# Patient Record
Sex: FEMALE | Race: WHITE | NOT HISPANIC OR LATINO | Employment: OTHER | URBAN - NONMETROPOLITAN AREA
[De-identification: names, ages, dates, MRNs, and addresses within clinical notes are randomized per-mention and may not be internally consistent; named-entity substitution may affect disease eponyms.]

---

## 2021-02-26 DIAGNOSIS — I10 ESSENTIAL HYPERTENSION: ICD-10-CM

## 2021-02-26 DIAGNOSIS — I10 ESSENTIAL HYPERTENSION: Primary | ICD-10-CM

## 2021-02-26 RX ORDER — LOSARTAN POTASSIUM 100 MG/1
100 TABLET ORAL DAILY
Qty: 90 TABLET | Refills: 3 | Status: SHIPPED | OUTPATIENT
Start: 2021-02-26 | End: 2021-07-02 | Stop reason: SDUPTHER

## 2021-02-26 RX ORDER — LOSARTAN POTASSIUM 100 MG/1
100 TABLET ORAL DAILY
COMMUNITY
End: 2021-02-26 | Stop reason: SDUPTHER

## 2021-02-26 RX ORDER — LOSARTAN POTASSIUM 100 MG/1
100 TABLET ORAL DAILY
Qty: 90 TABLET | Refills: 3 | Status: SHIPPED | OUTPATIENT
Start: 2021-02-26 | End: 2021-02-26 | Stop reason: SDUPTHER

## 2021-05-25 RX ORDER — LOSARTAN POTASSIUM 50 MG/1
TABLET ORAL
Qty: 90 TABLET | Refills: 0 | OUTPATIENT
Start: 2021-05-25

## 2021-06-15 DIAGNOSIS — I10 ESSENTIAL HYPERTENSION: ICD-10-CM

## 2021-06-15 DIAGNOSIS — I48.4 ATYPICAL ATRIAL FLUTTER (HCC): Primary | ICD-10-CM

## 2021-06-15 NOTE — TELEPHONE ENCOUNTER
NEEDS REFILL    FLECAINIDE 50MG 1BID  AND NEEDING HER BP MED  DOES NOT KNOW THE NAME    [facility]

## 2021-06-18 ENCOUNTER — TELEPHONE (OUTPATIENT)
Dept: CARDIOLOGY | Facility: CLINIC | Age: 66
End: 2021-06-18

## 2021-06-18 DIAGNOSIS — I10 ESSENTIAL HYPERTENSION: ICD-10-CM

## 2021-06-18 RX ORDER — LOSARTAN POTASSIUM 100 MG/1
100 TABLET ORAL DAILY
Qty: 30 TABLET | Refills: 0 | Status: CANCELLED | OUTPATIENT
Start: 2021-06-18

## 2021-06-18 RX ORDER — FLECAINIDE ACETATE 50 MG/1
50 TABLET ORAL 2 TIMES DAILY
Qty: 60 TABLET | Refills: 0 | Status: SHIPPED | OUTPATIENT
Start: 2021-06-18

## 2021-06-18 NOTE — TELEPHONE ENCOUNTER
PATIENT IS MAD THAT THE losartan (COZAAR) 100 MG tablet    HAS NOT BEEN FILLED SHE HAS BEEN OFF OF IT FOR 4 DAYS    PLEASE ADVISE    [facility] - [place], [place] - [address] [facility] [phone] [facility] [phone]

## 2021-06-18 NOTE — TELEPHONE ENCOUNTER
Wrong pharmacy was in the computer, I called correct pharm and gave them a verbal for the losartan

## 2021-07-02 ENCOUNTER — OFFICE VISIT (OUTPATIENT)
Dept: CARDIOLOGY | Facility: CLINIC | Age: 66
End: 2021-07-02

## 2021-07-02 VITALS
DIASTOLIC BLOOD PRESSURE: 76 MMHG | TEMPERATURE: 97 F | SYSTOLIC BLOOD PRESSURE: 136 MMHG | RESPIRATION RATE: 12 BRPM | HEIGHT: 62 IN | BODY MASS INDEX: 35.88 KG/M2 | HEART RATE: 79 BPM | WEIGHT: 195 LBS | OXYGEN SATURATION: 96 %

## 2021-07-02 DIAGNOSIS — Z95.0 PRESENCE OF CARDIAC PACEMAKER: ICD-10-CM

## 2021-07-02 DIAGNOSIS — I10 ESSENTIAL HYPERTENSION: ICD-10-CM

## 2021-07-02 DIAGNOSIS — E78.5 DYSLIPIDEMIA: ICD-10-CM

## 2021-07-02 DIAGNOSIS — F17.200 TOBACCO DEPENDENCE SYNDROME: ICD-10-CM

## 2021-07-02 DIAGNOSIS — I48.0 PAROXYSMAL ATRIAL FIBRILLATION (HCC): Primary | ICD-10-CM

## 2021-07-02 PROCEDURE — 93000 ELECTROCARDIOGRAM COMPLETE: CPT | Performed by: INTERNAL MEDICINE

## 2021-07-02 PROCEDURE — 99214 OFFICE O/P EST MOD 30 MIN: CPT | Performed by: INTERNAL MEDICINE

## 2021-07-02 RX ORDER — LOSARTAN POTASSIUM 100 MG/1
100 TABLET ORAL DAILY
Qty: 90 TABLET | Refills: 3 | Status: SHIPPED | OUTPATIENT
Start: 2021-07-02 | End: 2022-10-03

## 2021-07-02 RX ORDER — EXENATIDE 2 MG/.85ML
2 INJECTION, SUSPENSION, EXTENDED RELEASE SUBCUTANEOUS WEEKLY
COMMUNITY
Start: 2021-06-21

## 2021-07-02 RX ORDER — ATORVASTATIN CALCIUM 10 MG/1
10 TABLET, FILM COATED ORAL DAILY
COMMUNITY
Start: 2021-06-01

## 2021-07-02 RX ORDER — SERTRALINE HYDROCHLORIDE 100 MG/1
100 TABLET, FILM COATED ORAL 2 TIMES DAILY
COMMUNITY
Start: 2021-06-07

## 2021-07-02 RX ORDER — OXYCODONE AND ACETAMINOPHEN 7.5; 325 MG/1; MG/1
TABLET ORAL EVERY 6 HOURS
COMMUNITY
Start: 2021-06-05

## 2021-07-02 NOTE — PROGRESS NOTES
[facility]  [facility] CARDIOLOGY  [address] [place] [address]  Dept: [phone]  Dept Fax: [phone]    Мария Singh  1955    Follow Up Office Visit Note    History of Present Illness:  Мария Singh is a 65 y.o. female who presents to the clinic for Follow-up. Atrial fibrillation- flutter- she is doing fine, asymptomatic, on flecainide 50 mg bid and also eliquis, no complaints     The following portions of the patient's history were reviewed and updated as appropriate: allergies, current medications, past family history, past medical history, past social history, past surgical history and problem list.    Medications:  ALPRAZolam  aspirin EC  atorvastatin  Bydureon BCise auto-injector  chlorhexidine  estradiol  flecainide  glucose blood  insulin detemir  Insulin Pen Needle misc  Insulin Syringe-Needle U-100 misc  losartan  onetouch ultrasoft  oxyCODONE-acetaminophen  sertraline    Subjective  Allergies   Allergen Reactions   • Cephalexin Rash   • Sulfa Antibiotics Rash   • Trimethoprim Rash        Past Medical History:   Diagnosis Date   • Anxiety    • Atrial tachycardia (CMS/HCC)     S/P AV NODE ABLATION AND PPM   • Hypercholesterolemia    • Hyperlipidemia    • Increased serum lipids    • Paroxysmal atrial fibrillation (CMS/HCC)    • Paroxysmal atrial flutter (CMS/HCC)    • Presence of permanent cardiac pacemaker    • Tobacco dependence syndrome    • Type 2 diabetes mellitus (CMS/HCC)        Past Surgical History:   Procedure Laterality Date   • CHOLECYSTECTOMY     • HYSTERECTOMY      Total abdominal with removal of both ovaries   • PACEMAKER IMPLANTATION         Family History   Problem Relation Age of Onset   • Heart failure Mother    • Cancer Mother    • Hypertension Mother    • Diabetes Mother    • Cancer Father         LUNG   • Stroke Other    • Depression Sister         Social History     Socioeconomic History   • Marital status:      Spouse name:  "Not on file   • Number of children: Not on file   • Years of education: Not on file   • Highest education level: Not on file   Tobacco Use   • Smoking status: Current Every Day Smoker     Packs/day: 1.00     Years: 43.00     Pack years: 43.00     Types: Cigarettes   • Smokeless tobacco: Never Used   Substance and Sexual Activity   • Alcohol use: No   • Drug use: No   • Sexual activity: Defer       Review of Systems   Constitutional: Negative.    HENT: Negative.    Respiratory: Negative.    Cardiovascular: Negative.    Endocrine: Negative.    Genitourinary: Negative.    Musculoskeletal: Negative.    Skin: Negative.    Allergic/Immunologic: Negative.    Neurological: Negative.    Hematological: Negative.    Psychiatric/Behavioral: Negative.        Cardiovascular Procedures    ECHO/MUGA:   STRESS TESTS:   CARDIAC CATH:   DEVICES:   HOLTER:   CT/MRI:   VASCULAR:   CARDIOTHORACIC:     Objective  Vitals:    07/02/21 1019   BP: 136/76   BP Location: Left arm   Patient Position: Lying   Cuff Size: Adult   Pulse: 79   Resp: 12   Temp: 97 °F (36.1 °C)   TempSrc: Infrared   SpO2: 96%   Weight: 88.5 kg (195 lb)   Height: 157.5 cm (62\")   PainSc: 0-No pain     Body mass index is 35.67 kg/m².     Physical Exam  Constitutional:       Appearance: Healthy appearance. Not in distress.   Neck:      Vascular: No JVR. JVD normal.   Pulmonary:      Effort: Pulmonary effort is normal.      Breath sounds: Normal breath sounds. No wheezing. No rhonchi. No rales.   Chest:      Chest wall: Not tender to palpatation.   Cardiovascular:      PMI at left midclavicular line. Normal rate. Regular rhythm. Normal S1. Normal S2.      Murmurs: There is no murmur.      No gallop. No click. No rub.   Pulses:     Intact distal pulses.   Edema:     Peripheral edema absent.   Abdominal:      General: Bowel sounds are normal.      Palpations: Abdomen is soft.      Tenderness: There is no abdominal tenderness.   Musculoskeletal: Normal range of motion.        "  General: No tenderness. Skin:     General: Skin is warm and dry.   Neurological:      General: No focal deficit present.      Mental Status: Alert and oriented to person, place and time.          Diagnostic Data    ECG 12 Lead    Date/Time: 7/2/2021 10:47 AM  Performed by: Capo Lock MD  Authorized by: Capo Lock MD   Comparison: compared with previous ECG   Similar to previous ECG  Rhythm: paced  Rate: normal  QRS axis: normal  Pacing: atrial sensed rhythm and ventricular paced rhythm  Clinical impression: abnormal EKG            Assessment and Plan  Diagnoses and all orders for this visit:    Paroxysmal atrial fibrillation (CMS/HCC)-Doing good on flecainide 50 mg bid, and Eliquis, 5mg bid, will keep same meds EKG paced rhythm    Dyslipidemia- on Lipitor 10 mg, we need Lipid profile         Essential hypertension- BP is better 140.80 , on Losartan 50 mg, , will increase to 100 mg   -     losartan (COZAAR) 100 MG tablet; Take 1 tablet by mouth Daily.    Presence of cardiac pacemaker- This is working well    Other orders  -     atorvastatin (LIPITOR) 10 MG tablet  -     Bydureon BCise 2 MG/0.85ML auto-injector injection  -     oxyCODONE-acetaminophen (PERCOCET) 7.5-325 MG per tablet  -     sertraline (ZOLOFT) 100 MG tablet         Return in about 6 months (around 1/2/2022) for Recheck.    Capo Lock MD  07/02/2021

## 2021-07-15 ENCOUNTER — TELEPHONE (OUTPATIENT)
Dept: CARDIOLOGY | Facility: CLINIC | Age: 66
End: 2021-07-15

## 2021-07-15 NOTE — TELEPHONE ENCOUNTER
Called pt she said her bp med Losartan is too strong, says she gets a headache about 1 hour after taking med. Says it was upped from 50mg to 100mg, wants to know if she can go back on the 50mg? Please advise...

## 2021-07-15 NOTE — TELEPHONE ENCOUNTER
If your Blood pressure is high you need 100 mg or 50 mg twice a day, , lowering will not benefit you, ,

## 2021-07-16 NOTE — TELEPHONE ENCOUNTER
If your Blood pressure is high you need 100 mg or 50 mg twice a day, , lowering will not benefit you, ,.    Pt said it is making her constipated and  Gives her headaches. Pt said he will talk to pcp about all of this . Told her what dr heck said she will call back if she needs help

## 2021-07-28 ENCOUNTER — CLINICAL SUPPORT NO REQUIREMENTS (OUTPATIENT)
Dept: CARDIOLOGY | Facility: CLINIC | Age: 66
End: 2021-07-28

## 2021-07-28 DIAGNOSIS — Z95.0 CARDIAC PACEMAKER IN SITU: Primary | ICD-10-CM

## 2021-07-28 PROCEDURE — 93281 PM DEVICE PROGR EVAL MULTI: CPT | Performed by: INTERNAL MEDICINE

## 2021-10-20 ENCOUNTER — CLINICAL SUPPORT NO REQUIREMENTS (OUTPATIENT)
Dept: CARDIOLOGY | Facility: CLINIC | Age: 66
End: 2021-10-20

## 2021-10-20 DIAGNOSIS — Z95.0 CARDIAC PACEMAKER IN SITU: Primary | ICD-10-CM

## 2021-10-20 PROCEDURE — 93281 PM DEVICE PROGR EVAL MULTI: CPT | Performed by: INTERNAL MEDICINE

## 2022-01-05 ENCOUNTER — TELEPHONE (OUTPATIENT)
Dept: CARDIOLOGY | Facility: CLINIC | Age: 67
End: 2022-01-05

## 2022-01-05 NOTE — TELEPHONE ENCOUNTER
Called pt to ask about the recent Flecainide refill request through [facility], she said he PCP fills the med and she doesn't use that pharmacy anyways.

## 2022-01-12 ENCOUNTER — CLINICAL SUPPORT NO REQUIREMENTS (OUTPATIENT)
Dept: CARDIOLOGY | Facility: CLINIC | Age: 67
End: 2022-01-12

## 2022-01-12 DIAGNOSIS — Z95.0 CARDIAC PACEMAKER IN SITU: Primary | ICD-10-CM

## 2022-01-12 PROCEDURE — 93281 PM DEVICE PROGR EVAL MULTI: CPT | Performed by: INTERNAL MEDICINE

## 2022-04-20 ENCOUNTER — CLINICAL SUPPORT NO REQUIREMENTS (OUTPATIENT)
Dept: CARDIOLOGY | Facility: CLINIC | Age: 67
End: 2022-04-20

## 2022-04-20 DIAGNOSIS — Z95.0 CARDIAC PACEMAKER IN SITU: Primary | ICD-10-CM

## 2022-04-20 PROCEDURE — 93281 PM DEVICE PROGR EVAL MULTI: CPT | Performed by: INTERNAL MEDICINE

## 2022-05-03 ENCOUNTER — TELEPHONE (OUTPATIENT)
Dept: CARDIOLOGY | Facility: CLINIC | Age: 67
End: 2022-05-03

## 2022-07-20 ENCOUNTER — CLINICAL SUPPORT NO REQUIREMENTS (OUTPATIENT)
Dept: CARDIOLOGY | Facility: CLINIC | Age: 67
End: 2022-07-20

## 2022-07-20 DIAGNOSIS — Z95.0 CARDIAC PACEMAKER IN SITU: Primary | ICD-10-CM

## 2022-07-20 DIAGNOSIS — Z95.0 PRESENCE OF CARDIAC PACEMAKER: Primary | ICD-10-CM

## 2022-07-20 DIAGNOSIS — I48.0 PAROXYSMAL ATRIAL FIBRILLATION: ICD-10-CM

## 2022-07-20 PROCEDURE — 93281 PM DEVICE PROGR EVAL MULTI: CPT | Performed by: INTERNAL MEDICINE

## 2022-08-09 ENCOUNTER — OFFICE VISIT (OUTPATIENT)
Dept: CARDIOLOGY | Facility: CLINIC | Age: 67
End: 2022-08-09

## 2022-08-09 VITALS
HEART RATE: 93 BPM | WEIGHT: 194 LBS | HEIGHT: 62 IN | SYSTOLIC BLOOD PRESSURE: 132 MMHG | RESPIRATION RATE: 18 BRPM | OXYGEN SATURATION: 99 % | TEMPERATURE: 98 F | BODY MASS INDEX: 35.7 KG/M2 | DIASTOLIC BLOOD PRESSURE: 72 MMHG

## 2022-08-09 DIAGNOSIS — E78.5 DYSLIPIDEMIA: ICD-10-CM

## 2022-08-09 DIAGNOSIS — Z95.0 PRESENCE OF CARDIAC PACEMAKER: ICD-10-CM

## 2022-08-09 DIAGNOSIS — R60.0 BILATERAL LEG EDEMA: ICD-10-CM

## 2022-08-09 DIAGNOSIS — F17.200 TOBACCO DEPENDENCE SYNDROME: ICD-10-CM

## 2022-08-09 DIAGNOSIS — I48.0 PAROXYSMAL ATRIAL FIBRILLATION: Primary | ICD-10-CM

## 2022-08-09 DIAGNOSIS — I50.32 CHRONIC DIASTOLIC CONGESTIVE HEART FAILURE: ICD-10-CM

## 2022-08-09 PROBLEM — I38 DIASTOLIC CHF DUE TO VALVULAR DISEASE: Status: ACTIVE | Noted: 2022-08-09

## 2022-08-09 PROBLEM — I50.30 DIASTOLIC CHF DUE TO VALVULAR DISEASE: Status: RESOLVED | Noted: 2022-08-09 | Resolved: 2022-08-09

## 2022-08-09 PROBLEM — I38 DIASTOLIC CHF DUE TO VALVULAR DISEASE: Status: RESOLVED | Noted: 2022-08-09 | Resolved: 2022-08-09

## 2022-08-09 PROBLEM — I50.30 DIASTOLIC CHF DUE TO VALVULAR DISEASE (HCC): Status: ACTIVE | Noted: 2022-08-09

## 2022-08-09 PROCEDURE — 99214 OFFICE O/P EST MOD 30 MIN: CPT | Performed by: INTERNAL MEDICINE

## 2022-08-09 PROCEDURE — 93000 ELECTROCARDIOGRAM COMPLETE: CPT | Performed by: INTERNAL MEDICINE

## 2022-08-09 NOTE — PROGRESS NOTES
[facility]  [facility] CARDIOLOGY  [address] [place] [address]  Dept: [phone]  Dept Fax: [phone]    Мария Singh  1955    Follow Up Office Visit Note    History of Present Illness:  Мария Singh is a 67 y.o. female who presents to the clinic for Follow-up.Paroxysmal atrial fibrillation- She denies any complaints on Flecainide 50 mg bid and also Eliquis 5 mg bid , EKG ventricular paced rhythm no changes,     The following portions of the patient's history were reviewed and updated as appropriate: allergies, current medications, past family history, past medical history, past social history, past surgical history and problem list.    Medications:  ALPRAZolam  aspirin EC  atorvastatin  Bydureon BCise auto-injector  chlorhexidine  estradiol  flecainide  glucose blood  insulin detemir  Insulin Pen Needle misc  Insulin Syringe-Needle U-100 misc  losartan  onetouch ultrasoft  oxyCODONE-acetaminophen  sertraline    Subjective  Allergies   Allergen Reactions   • Cephalexin Rash   • Sulfa Antibiotics Rash   • Trimethoprim Rash        Past Medical History:   Diagnosis Date   • Anxiety    • Atrial tachycardia (HCC)     S/P AV NODE ABLATION AND PPM   • Hypercholesterolemia    • Hyperlipidemia    • Increased serum lipids    • Paroxysmal atrial fibrillation (HCC)    • Paroxysmal atrial flutter (HCC)    • Presence of permanent cardiac pacemaker    • Tobacco dependence syndrome    • Type 2 diabetes mellitus (HCC)        Past Surgical History:   Procedure Laterality Date   • CHOLECYSTECTOMY     • HYSTERECTOMY      Total abdominal with removal of both ovaries   • PACEMAKER IMPLANTATION         Family History   Problem Relation Age of Onset   • Heart failure Mother    • Cancer Mother    • Hypertension Mother    • Diabetes Mother    • Cancer Father         LUNG   • Stroke Other    • Depression Sister         Social History     Socioeconomic History   • Marital status:   "  Tobacco Use   • Smoking status: Current Every Day Smoker     Packs/day: 1.00     Years: 43.00     Pack years: 43.00     Types: Cigarettes   • Smokeless tobacco: Never Used   Substance and Sexual Activity   • Alcohol use: No   • Drug use: No   • Sexual activity: Defer       Review of Systems   Constitutional: Negative.    HENT: Negative.    Respiratory: Negative.    Cardiovascular: Negative.    Endocrine: Negative.    Genitourinary: Negative.    Musculoskeletal: Negative.    Skin: Negative.    Allergic/Immunologic: Negative.    Neurological: Negative.    Hematological: Negative.    Psychiatric/Behavioral: Negative.        Cardiovascular Procedures    ECHO/MUGA:   STRESS TESTS:   CARDIAC CATH:   DEVICES:   HOLTER:   CT/MRI:   VASCULAR:   CARDIOTHORACIC:     Objective  Vitals:    08/09/22 1359   BP: 132/72   BP Location: Left arm   Patient Position: Sitting   Cuff Size: Adult   Pulse: 93   Resp: 18   Temp: 98 °F (36.7 °C)   TempSrc: Infrared   SpO2: 99%   Weight: 88 kg (194 lb)   Height: 157.5 cm (62\")   PainSc: 0-No pain     Body mass index is 35.48 kg/m².     Physical Exam  Constitutional:       Appearance: Healthy appearance. Not in distress.   Neck:      Vascular: No JVR. JVD normal.   Pulmonary:      Effort: Pulmonary effort is normal.      Breath sounds: Normal breath sounds. No wheezing. No rhonchi. No rales.   Chest:      Chest wall: Not tender to palpatation.   Cardiovascular:      PMI at left midclavicular line. Normal rate. Regular rhythm. Normal S1. Normal S2.      Murmurs: There is no murmur.      No gallop. No click. No rub.   Pulses:     Intact distal pulses.   Edema:     Peripheral edema absent.   Abdominal:      General: Bowel sounds are normal.      Palpations: Abdomen is soft.      Tenderness: There is no abdominal tenderness.   Musculoskeletal: Normal range of motion.         General: No tenderness. Skin:     General: Skin is warm and dry.   Neurological:      General: No focal deficit present.    "   Mental Status: Alert and oriented to person, place and time.          Diagnostic Data    ECG 12 Lead    Date/Time: 8/9/2022 3:35 PM  Performed by: Capo Lock MD  Authorized by: Capo Lock MD   Comparison: compared with previous ECG from 7/2/2021  Similar to previous ECG  Rhythm: sinus rhythm and paced  Rate: normal  BPM: 79  QRS axis: left    Clinical impression: normal ECG            Assessment and Plan  Diagnoses and all orders for this visit:    Paroxysmal atrial fibrillation (HCC)- In sinus asymptomatic on Flecainide 50 mg bid and Eliquis 5 mg bid  -     CBC & Differential  -     Comprehensive Metabolic Panel    Presence of cardiac pacemaker- This is working well., however she is depleting the battery quickly, we have sent her to see Dr Gagnon    Dyslipidemia- On Lipitor 10 mg    Tobacco dependence syndrome- Still smoking     Bilateral leg edema- for the last few weeks has had some bilateral edema, her PCP has rx lasix 20 mg, her lab from the Hospital ER Hb 8, will get lab       -     proBNP         No follow-ups on file.    Capo Lock MD  08/09/2022

## 2022-08-10 ENCOUNTER — TELEPHONE (OUTPATIENT)
Dept: CARDIOLOGY | Facility: CLINIC | Age: 67
End: 2022-08-10

## 2022-08-10 LAB
ALBUMIN SERPL-MCNC: 3.8 G/DL (ref 3.8–4.8)
ALBUMIN/GLOB SERPL: 1.3 {RATIO} (ref 1.2–2.2)
ALP SERPL-CCNC: 179 IU/L (ref 44–121)
ALT SERPL-CCNC: 16 IU/L (ref 0–32)
AST SERPL-CCNC: 22 IU/L (ref 0–40)
BASOPHILS # BLD AUTO: 0.1 X10E3/UL (ref 0–0.2)
BASOPHILS NFR BLD AUTO: 1 %
BILIRUB SERPL-MCNC: <0.2 MG/DL (ref 0–1.2)
BUN SERPL-MCNC: 18 MG/DL (ref 8–27)
BUN/CREAT SERPL: 18 (ref 12–28)
CALCIUM SERPL-MCNC: 8.9 MG/DL (ref 8.7–10.3)
CHLORIDE SERPL-SCNC: 103 MMOL/L (ref 96–106)
CO2 SERPL-SCNC: 24 MMOL/L (ref 20–29)
CREAT SERPL-MCNC: 1.01 MG/DL (ref 0.57–1)
EGFRCR SERPLBLD CKD-EPI 2021: 61 ML/MIN/1.73
EOSINOPHIL # BLD AUTO: 0.3 X10E3/UL (ref 0–0.4)
EOSINOPHIL NFR BLD AUTO: 5 %
ERYTHROCYTE [DISTWIDTH] IN BLOOD BY AUTOMATED COUNT: 15.7 % (ref 11.7–15.4)
GLOBULIN SER CALC-MCNC: 2.9 G/DL (ref 1.5–4.5)
GLUCOSE SERPL-MCNC: 145 MG/DL (ref 65–99)
HCT VFR BLD AUTO: 28.4 % (ref 34–46.6)
HGB BLD-MCNC: 8.4 G/DL (ref 11.1–15.9)
IMM GRANULOCYTES # BLD AUTO: 0 X10E3/UL (ref 0–0.1)
IMM GRANULOCYTES NFR BLD AUTO: 0 %
LYMPHOCYTES # BLD AUTO: 1.4 X10E3/UL (ref 0.7–3.1)
LYMPHOCYTES NFR BLD AUTO: 27 %
MCH RBC QN AUTO: 24.2 PG (ref 26.6–33)
MCHC RBC AUTO-ENTMCNC: 29.6 G/DL (ref 31.5–35.7)
MCV RBC AUTO: 82 FL (ref 79–97)
MONOCYTES # BLD AUTO: 0.4 X10E3/UL (ref 0.1–0.9)
MONOCYTES NFR BLD AUTO: 8 %
NEUTROPHILS # BLD AUTO: 3.1 X10E3/UL (ref 1.4–7)
NEUTROPHILS NFR BLD AUTO: 59 %
NT-PROBNP SERPL-MCNC: 226 PG/ML (ref 0–301)
PLATELET # BLD AUTO: 228 X10E3/UL (ref 150–450)
POTASSIUM SERPL-SCNC: 4.5 MMOL/L (ref 3.5–5.2)
PROT SERPL-MCNC: 6.7 G/DL (ref 6–8.5)
RBC # BLD AUTO: 3.47 X10E6/UL (ref 3.77–5.28)
SODIUM SERPL-SCNC: 138 MMOL/L (ref 134–144)
WBC # BLD AUTO: 5.3 X10E3/UL (ref 3.4–10.8)

## 2022-08-10 NOTE — TELEPHONE ENCOUNTER
Called to follow up on remote monitor. Has not received from [employer] yet. Was ordered 05/31/2022. [employer] unable to give me a estimated arrival time for monitor. Please continue to follow device in office until he can set her up with remote monitoring.

## 2022-08-10 NOTE — TELEPHONE ENCOUNTER
The lab indicated that you are anemic same as at the hospital  will order iron, ferritin transferrin  sat iron studies, the BNP for you heart is normal, please see your MD,    pt is going to call pcp for anemic. Pt is understands what to do .

## 2022-08-10 NOTE — TELEPHONE ENCOUNTER
----- Message from Capo Lock MD sent at 8/10/2022  4:46 PM EDT -----  The lab indicated that you are anemic same as at the hospital  will order iron, ferritin transferrin  sat iron studies, the BNP for you heart is normal, please see your MD,

## 2022-10-03 ENCOUNTER — OFFICE VISIT (OUTPATIENT)
Dept: CARDIOLOGY | Facility: CLINIC | Age: 67
End: 2022-10-03

## 2022-10-03 VITALS
BODY MASS INDEX: 36.44 KG/M2 | OXYGEN SATURATION: 97 % | DIASTOLIC BLOOD PRESSURE: 66 MMHG | WEIGHT: 198 LBS | HEIGHT: 62 IN | SYSTOLIC BLOOD PRESSURE: 112 MMHG | HEART RATE: 75 BPM

## 2022-10-03 DIAGNOSIS — I50.32 CHRONIC DIASTOLIC CONGESTIVE HEART FAILURE: ICD-10-CM

## 2022-10-03 DIAGNOSIS — Z95.0 PRESENCE OF CARDIAC PACEMAKER: ICD-10-CM

## 2022-10-03 DIAGNOSIS — I48.0 PAROXYSMAL ATRIAL FIBRILLATION: ICD-10-CM

## 2022-10-03 DIAGNOSIS — I44.2 COMPLETE HEART BLOCK: Primary | ICD-10-CM

## 2022-10-03 PROBLEM — D50.9 IRON DEFICIENCY ANEMIA: Status: ACTIVE | Noted: 2022-10-03

## 2022-10-03 PROBLEM — E66.812 CLASS 2 OBESITY IN ADULT: Status: ACTIVE | Noted: 2022-10-03

## 2022-10-03 PROBLEM — E66.9 CLASS 2 OBESITY IN ADULT: Status: ACTIVE | Noted: 2022-10-03

## 2022-10-03 PROCEDURE — 99204 OFFICE O/P NEW MOD 45 MIN: CPT | Performed by: INTERNAL MEDICINE

## 2022-10-03 PROCEDURE — 93000 ELECTROCARDIOGRAM COMPLETE: CPT | Performed by: INTERNAL MEDICINE

## 2022-10-03 RX ORDER — POTASSIUM CHLORIDE 750 MG/1
10 TABLET, FILM COATED, EXTENDED RELEASE ORAL DAILY
COMMUNITY

## 2022-10-03 RX ORDER — FUROSEMIDE 20 MG/1
20 TABLET ORAL DAILY
COMMUNITY

## 2022-10-03 NOTE — PROGRESS NOTES
Cardiac Electrophysiology Outpatient Consult Note            [facility]    Consult Note     Мария Singh  [id number]  10/03/2022  [phone]     Primary Care Physician: Montserrat Flores APRN    Referred By: Capo Lock, *    Subjective     Chief Complaint:   Diagnoses and all orders for this visit:    1. Complete heart block (HCC) (Primary)  -     ECG 12 Lead    2. Chronic diastolic congestive heart failure (HCC)    3. Presence of cardiac pacemaker    4. Paroxysmal atrial fibrillation (HCC)      Chief Complaint   Patient presents with   • Atrial Fibrillation   • Pacemaker Problem       History of Present Illness:   Мария Singh is a 67 y.o. female who presents to my electrophysiology clinic for evaluation of pacemaker battery depletion.  She is a moderately poor historian for details.  States she had original pacemaker implantation in 2008.  At some point she had an AV node ablation.  She now has a biventricular permanent pacemaker.  When last seen at her primary cardiologist she was noted to have rapid battery depletion.  She presents today with no complaints of exertional chest pain or dyspnea and orthopnea no PND no claudication.  Her chronic lower extremity edema is currently well managed.  She has no awareness of tachypalpitations, denies dizziness or syncope.  Her blood pressure at home typically runs about 120 mmHg systolic.  She has never had a sleep study.  She denies snoring.    Past Medical History:   Patient Active Problem List    Diagnosis Date Noted   • Class 2 obesity in adult 10/03/2022   • Complete heart block (HCC) 10/03/2022     Note Last Updated: 10/3/2022     · [employer] BiV permanent pacemaker, last generator change 7-20-20     • Iron deficiency anemia 10/03/2022   • Atrial tachycardia (HCC)      Note Last Updated: 10/3/2022        • Chronic diastolic congestive heart failure (HCC) 08/09/2022     Note Last Updated: 10/3/2022  "    · Echocardiogram 7/10/2017: EF 70-75% with normal valvular morphology     • Atrial fibrillation (HCC) 08/12/2016     Note Last Updated: 10/3/2022     · S/P AV NODE ABLATION AND PPM     • Uncontrolled type 2 diabetes mellitus 06/13/2016   • Tobacco abuse 06/13/2016   • Dyslipidemia 06/13/2016   • Presence of cardiac pacemaker 06/13/2016       Past Surgical History:   Past Surgical History:   Procedure Laterality Date   • CHOLECYSTECTOMY     • COLONOSCOPY  09/30/2022   • HYSTERECTOMY      Total abdominal with removal of both ovaries   • PACEMAKER IMPLANTATION         Social History:   Social History     Socioeconomic History   • Marital status:    Tobacco Use   • Smoking status: Current Every Day Smoker     Packs/day: 1.00     Years: 43.00     Pack years: 43.00     Types: Cigarettes   • Smokeless tobacco: Never Used   Substance and Sexual Activity   • Alcohol use: No   • Drug use: No   • Sexual activity: Defer       Medications:     Current Outpatient Medications:   •  ALPRAZolam (XANAX) 1 MG tablet, Take  by mouth., Disp: , Rfl:   •  aspirin  MG EC tablet, Take 1 tablet by mouth., Disp: , Rfl:   •  atorvastatin (LIPITOR) 10 MG tablet, Take 10 mg by mouth Daily., Disp: , Rfl:   •  Bydureon BCise 2 MG/0.85ML auto-injector injection, , Disp: , Rfl:   •  estradiol (ESTRACE) 2 MG tablet, Take  by mouth daily., Disp: , Rfl:   •  flecainide (TAMBOCOR) 50 MG tablet, Take 1 tablet by mouth 2 (Two) Times a Day., Disp: 60 tablet, Rfl: 0  •  furosemide (LASIX) 20 MG tablet, Take 20 mg by mouth Daily., Disp: , Rfl:   •  glucose blood (ONE TOUCH ULTRA TEST) test strip, Check blood sugar X 3/day, Disp: 10 each, Rfl: 5  •  glucose blood test strip, 3 (three) times a day., Disp: , Rfl:   •  Insulin Pen Needle 32G X 4 MM misc, , Disp: , Rfl:   •  Insulin Syringe-Needle U-100 30G X 5/16\" 0.5 ML misc, Uses daily, Disp: 100 each, Rfl: 0  •  Lancets (ONETOUCH ULTRASOFT) lancets, , Disp: , Rfl:   •  Lancets (ONETOUCH " "ULTRASOFT) lancets, Uses X 3 per day, Disp: 100 each, Rfl: 5  •  oxyCODONE-acetaminophen (PERCOCET) 7.5-325 MG per tablet, , Disp: , Rfl:   •  potassium chloride 10 MEQ CR tablet, Take 10 mEq by mouth Daily., Disp: , Rfl:   •  sertraline (ZOLOFT) 100 MG tablet, , Disp: , Rfl:     Allergies:   Allergies   Allergen Reactions   • Metformin Diarrhea and Swelling   • Cephalexin Rash   • Influenza Virus Vaccine Swelling   • Sulfa Antibiotics Rash   • Trimethoprim Rash       Objective   Vital Signs:   Vitals:    10/03/22 0826   BP: 112/66   BP Location: Left arm   Patient Position: Sitting   Pulse: 75   SpO2: 97%   Weight: 89.8 kg (198 lb)   Height: 157.5 cm (62\")       PHYSICAL EXAM  General appearance: Awake, alert, cooperative  Head: Normocephalic, without obvious abnormality, atraumatic  Eyes: Conjunctivae/corneas clear, EOMs intact  Neck: no adenopathy, no carotid bruit, no JVD and thyroid: not enlarged  Lungs: clear to auscultation bilaterally and no rhonchi or crackles\", ' symmetric  Heart: regular rate and rhythm, S1, S2 normal, no murmur, click, rub or gallop  Abdomen: Soft, non-tender, bowel sounds normal,  no organomegaly  Extremities: extremities normal, atraumatic, no cyanosis or edema  Skin: Skin color, turgor normal, no rashes or lesions  Neurologic: Grossly normal     Lab Results   Component Value Date    GLUCOSE 145 (H) 08/09/2022    CALCIUM 8.9 08/09/2022     08/09/2022    K 4.5 08/09/2022    CO2 24 08/09/2022     08/09/2022    BUN 18 08/09/2022    CREATININE 1.01 (H) 08/09/2022    BCR 18 08/09/2022    ANIONGAP 9 02/02/2016     Lab Results   Component Value Date    WBC 5.3 08/09/2022    HGB 8.4 (L) 08/09/2022    HCT 29.1 08/15/2022    MCV 82 08/09/2022     08/09/2022     Cardiac Testing:      I personally viewed and interpreted the patient's EKG/Telemetry/lab data      ECG 12 Lead    Date/Time: 10/3/2022 9:01 AM  Performed by: Hipolito Gagnon DO  Authorized by: Hipolito Gagnon DO "   Previous ECG: no previous ECG available  Rate: normal  BPM: 76  Pacing: atrial sensed rhythm  Clinical impression: abnormal EKG            Tobacco Cessation: Cessation Counseling Provided   Obstructive Sleep Apnea Screening: N/A    Assessment & Plan    Diagnoses and all orders for this visit:    1. Complete heart block (HCC) (Primary)  -     ECG 12 Lead    2. Chronic diastolic congestive heart failure (HCC)    3. Presence of cardiac pacemaker    4. Paroxysmal atrial fibrillation (HCC)         Diagnosis Plan   1. Complete heart block (HCC)   rate control for permanent A. fib.    AV node ablation.    Pacemaker dependent   2. Chronic diastolic congestive heart failure (HCC)   stable.  Euvolemic.  Doing well.  Preserved LV systolic function.   3. Presence of cardiac pacemaker   resynchronization pacemaker.  Left ventricular pacing threshold quite elevated chronically.  Bipolar left ventricular lead placed many years ago.    Battery longevity is suboptimal.    As she approaches the elective replacement indicator we will give further thought to elective LV lead revision.  I am optimistic that we can give her lead with a better pacing threshold and avoid phrenic nerve stimulation which has been problematic for her.   4. Paroxysmal atrial fibrillation (HCC)   flecainide therapy.  50 mg p.o. twice daily.     Body mass index is 36.21 kg/m².    I spent 53 minutes in consultation with this patient which included more than 65% of this time in direct face-to-face counseling, physical examination and discussion of my assessment and findings and shared decision making with the patient.  The remainder of the time not spent face to face was performing one, some or all of the following actions:  preparing to see this patient ( eg. Review of tests),  ordering medications, tests or procedures ), care coordination, discussion of the plan with other healthcare providers, documenting clinical information in Epic well as independently  interpreting results and communicating results to patient, family and or caregiver.  All time noted occurred on the date of service.    Follow Up:       Thank you for allowing me to participate in the care of your patient. Please to not hesitate to contact me with additional questions or concerns.      Hipolito Gagnon DO, [facility], [facility], [place]  Cardiac Electrophysiologist  [facility] / [facility]    Scribed for Hipolito Gagnon DO Scribed for Hipolito Gagnon DO by Electronically signed by STEPHEN Kramer, 10/03/22, 9:02 AM EDT.

## 2022-10-19 ENCOUNTER — CLINICAL SUPPORT NO REQUIREMENTS (OUTPATIENT)
Dept: CARDIOLOGY | Facility: CLINIC | Age: 67
End: 2022-10-19

## 2022-10-19 DIAGNOSIS — Z95.0 CARDIAC PACEMAKER IN SITU: Primary | ICD-10-CM

## 2022-10-19 PROCEDURE — 93281 PM DEVICE PROGR EVAL MULTI: CPT | Performed by: INTERNAL MEDICINE

## 2022-10-20 ENCOUNTER — TELEPHONE (OUTPATIENT)
Dept: CARDIOLOGY | Facility: CLINIC | Age: 67
End: 2022-10-20

## 2022-10-20 NOTE — TELEPHONE ENCOUNTER
"Remote monitoring, per [facility] patient has received monitor. Called patient, she reports she got \"machine\" in and it will not  were she lives. I ask her if she and I could work to get it up and running. She is going to get the box and cellular adapter and call me back.   "

## 2023-01-18 ENCOUNTER — CLINICAL SUPPORT NO REQUIREMENTS (OUTPATIENT)
Dept: CARDIOLOGY | Facility: CLINIC | Age: 68
End: 2023-01-18
Payer: MEDICARE

## 2023-01-18 DIAGNOSIS — Z95.0 CARDIAC PACEMAKER IN SITU: Primary | ICD-10-CM

## 2023-01-18 PROCEDURE — 93281 PM DEVICE PROGR EVAL MULTI: CPT | Performed by: INTERNAL MEDICINE

## 2023-02-06 ENCOUNTER — TELEPHONE (OUTPATIENT)
Dept: CARDIOLOGY | Facility: CLINIC | Age: 68
End: 2023-02-06
Payer: MEDICARE

## 2023-02-06 NOTE — TELEPHONE ENCOUNTER
Left a message for Ms. Samantha that the [employer] rep will be here on 2/15 at 3pmg.  You have a f/u with Dr. Lock on 2/9.  If you would like me to changed your appointment with Dr. Lock to the 15th as well, I would be happy to do that.  Please call and ask for Kinga.

## 2023-02-07 NOTE — TELEPHONE ENCOUNTER
Patient returned your call and her appointment with Dr Lock has been rescheduled to Wed, 2/10, same time as pacemaker check.

## 2023-02-15 ENCOUNTER — CLINICAL SUPPORT NO REQUIREMENTS (OUTPATIENT)
Dept: CARDIOLOGY | Facility: CLINIC | Age: 68
End: 2023-02-15
Payer: MEDICARE

## 2023-02-15 ENCOUNTER — OFFICE VISIT (OUTPATIENT)
Dept: CARDIOLOGY | Facility: CLINIC | Age: 68
End: 2023-02-15
Payer: MEDICARE

## 2023-02-15 VITALS
RESPIRATION RATE: 18 BRPM | SYSTOLIC BLOOD PRESSURE: 130 MMHG | BODY MASS INDEX: 35.33 KG/M2 | HEART RATE: 84 BPM | OXYGEN SATURATION: 98 % | DIASTOLIC BLOOD PRESSURE: 64 MMHG | WEIGHT: 192 LBS | HEIGHT: 62 IN | TEMPERATURE: 97.8 F

## 2023-02-15 DIAGNOSIS — Z95.0 CARDIAC PACEMAKER IN SITU: Primary | ICD-10-CM

## 2023-02-15 DIAGNOSIS — Z72.0 TOBACCO ABUSE: ICD-10-CM

## 2023-02-15 DIAGNOSIS — E78.5 DYSLIPIDEMIA: ICD-10-CM

## 2023-02-15 DIAGNOSIS — Z95.0 PRESENCE OF CARDIAC PACEMAKER: ICD-10-CM

## 2023-02-15 DIAGNOSIS — I50.32 CHRONIC DIASTOLIC CONGESTIVE HEART FAILURE: ICD-10-CM

## 2023-02-15 DIAGNOSIS — I48.0 PAROXYSMAL ATRIAL FIBRILLATION: Primary | ICD-10-CM

## 2023-02-15 PROCEDURE — 93000 ELECTROCARDIOGRAM COMPLETE: CPT | Performed by: INTERNAL MEDICINE

## 2023-02-15 PROCEDURE — 99214 OFFICE O/P EST MOD 30 MIN: CPT | Performed by: INTERNAL MEDICINE

## 2023-02-15 PROCEDURE — 93281 PM DEVICE PROGR EVAL MULTI: CPT | Performed by: INTERNAL MEDICINE

## 2023-02-15 NOTE — PROGRESS NOTES
[facility] [place]  [facility] CARDIOLOGY  [address] [place] [address]  Dept: [phone]  Dept Fax: [phone]    Мария Singh  1955    Follow Up Office Visit Note    History of Present Illness:  Мария Singh is a 67 y.o. female who presents to the clinic for Follow-up. Paroxysmal atrial fibrillation- She has been doing good, but has stop the Eliquis due to Gi complaints, will try Xarelto, she was told her Dawson score is at least 3 and needs to take stronger blood thinner. , she will stop ASA and will take Xarelto 20 mg     The following portions of the patient's history were reviewed and updated as appropriate: allergies, current medications, past family history, past medical history, past social history, past surgical history, and problem list.    Medications:  ALPRAZolam  atorvastatin  Bydureon BCise auto-injector  estradiol  flecainide  furosemide  glucose blood  Insulin Pen Needle misc  Insulin Syringe-Needle U-100 misc  onetouch ultrasoft  oxyCODONE-acetaminophen  potassium chloride  rivaroxaban  sertraline    Subjective  Allergies   Allergen Reactions   • Metformin Diarrhea and Swelling   • Cephalexin Rash   • Influenza Virus Vaccine Swelling   • Sulfa Antibiotics Rash   • Trimethoprim Rash        Past Medical History:   Diagnosis Date   • Anxiety    • Atrial tachycardia (HCC)     S/P AV NODE ABLATION AND PPM       Past Surgical History:   Procedure Laterality Date   • CHOLECYSTECTOMY     • COLONOSCOPY  09/30/2022   • HYSTERECTOMY      Total abdominal with removal of both ovaries   • PACEMAKER IMPLANTATION         Family History   Problem Relation Age of Onset   • Heart failure Mother    • Cancer Mother    • Hypertension Mother    • Diabetes Mother    • Cancer Father         LUNG   • Stroke Other    • Depression Sister         Social History     Socioeconomic History   • Marital status:    Tobacco Use   • Smoking status: Every Day     Packs/day: 1.00      "Years: 43.00     Pack years: 43.00     Types: Cigarettes   • Smokeless tobacco: Never   Substance and Sexual Activity   • Alcohol use: No   • Drug use: No   • Sexual activity: Defer       Review of Systems   Constitutional: Negative.    HENT: Negative.    Respiratory: Negative.    Cardiovascular: Negative.    Endocrine: Negative.    Genitourinary: Negative.    Musculoskeletal: Negative.    Skin: Negative.    Allergic/Immunologic: Negative.    Neurological: Negative.    Hematological: Negative.    Psychiatric/Behavioral: Negative.        Cardiovascular Procedures    ECHO/MUGA:  STRESS TESTS:   CARDIAC CATH:   DEVICES:   HOLTER:   CT/MRI:   VASCULAR:   CARDIOTHORACIC:     Objective  Vitals:    02/15/23 1431   BP: 130/64   BP Location: Right arm   Patient Position: Lying   Cuff Size: Large Adult   Pulse: 84   Resp: 18   Temp: 97.8 °F (36.6 °C)   TempSrc: Infrared   SpO2: 98%   Weight: 87.1 kg (192 lb)   Height: 157.5 cm (62\")   PainSc: 0-No pain     Body mass index is 35.12 kg/m².     Physical Exam  Vitals reviewed.   Constitutional:       Appearance: Healthy appearance. Not in distress.   Neck:      Vascular: No JVR. JVD normal.   Pulmonary:      Effort: Pulmonary effort is normal.      Breath sounds: Normal breath sounds. No wheezing. No rhonchi. No rales.   Chest:      Chest wall: Not tender to palpatation.   Cardiovascular:      PMI at left midclavicular line. Normal rate. Regular rhythm. Normal S1. Normal S2.      Murmurs: There is no murmur.      No gallop. No click. No rub.   Pulses:     Intact distal pulses.   Edema:     Peripheral edema absent.   Abdominal:      General: Bowel sounds are normal.      Palpations: Abdomen is soft.      Tenderness: There is no abdominal tenderness.   Musculoskeletal: Normal range of motion.         General: No tenderness. Skin:     General: Skin is warm and dry.   Neurological:      General: No focal deficit present.      Mental Status: Alert and oriented to person, place and " time.          Diagnostic Data    ECG 12 Lead    Date/Time: 2/15/2023 4:16 PM  Performed by: Capo Lock MD  Authorized by: Capo Lock MD   Comparison: compared with previous ECG from 10/3/2022  Similar to previous ECG  Rhythm: sinus rhythm and paced  Rate: normal  BPM: 73  QRS axis: normal  Pacing: ventricular pacing  Clinical impression: abnormal EKG            Assessment and Plan  Diagnoses and all orders for this visit:    Paroxysmal atrial fibrillation (HCC)- In Sinus on Flecainide 50 mg bid and will start Xarelto 20 mg,     Presence of cardiac pacemaker- The battery is running down, will see Dr Gagnon     Tobacco abuse- She is still smoking     Dyslipidemia- On Lipitor 10 mg     Chronic diastolic congestive heart failure (HCC)- On Lasix 20 mg  , she has had lab with PCP    Other orders  -     rivaroxaban (Xarelto) 20 MG tablet; Take 1 tablet by mouth Daily.         Return in about 6 months (around 8/15/2023) for Recheck with Dr. Lock.    Capo Lock MD  02/15/2023

## 2023-02-16 ENCOUNTER — TELEPHONE (OUTPATIENT)
Dept: CARDIOLOGY | Facility: CLINIC | Age: 68
End: 2023-02-16
Payer: MEDICARE

## 2023-02-16 NOTE — TELEPHONE ENCOUNTER
Called Mrs. Signh back and advised her new appointment is set for 3/13/2023 at 2:45. She verbalized understanding.

## 2023-02-16 NOTE — TELEPHONE ENCOUNTER
"  Caller: NERI    Relationship: SELF    Best call back number: [phone] / [phone]    What medications are you currently taking:   Current Outpatient Medications on File Prior to Visit   Medication Sig Dispense Refill   • ALPRAZolam (XANAX) 1 MG tablet Take  by mouth.     • atorvastatin (LIPITOR) 10 MG tablet Take 10 mg by mouth Daily.     • Bydureon BCise 2 MG/0.85ML auto-injector injection      • estradiol (ESTRACE) 2 MG tablet Take  by mouth daily.     • flecainide (TAMBOCOR) 50 MG tablet Take 1 tablet by mouth 2 (Two) Times a Day. 60 tablet 0   • furosemide (LASIX) 20 MG tablet Take 20 mg by mouth Daily.     • glucose blood (ONE TOUCH ULTRA TEST) test strip Check blood sugar X 3/day 10 each 5   • glucose blood test strip 3 (three) times a day.     • Insulin Pen Needle 32G X 4 MM misc      • Insulin Syringe-Needle U-100 30G X 5/16\" 0.5 ML misc Uses daily 100 each 0   • Lancets (ONETOUCH ULTRASOFT) lancets      • Lancets (ONETOUCH ULTRASOFT) lancets Uses X 3 per day 100 each 5   • oxyCODONE-acetaminophen (PERCOCET) 7.5-325 MG per tablet      • potassium chloride 10 MEQ CR tablet Take 10 mEq by mouth Daily.     • rivaroxaban (Xarelto) 20 MG tablet Take 1 tablet by mouth Daily. 90 tablet 2   • sertraline (ZOLOFT) 100 MG tablet        No current facility-administered medications on file prior to visit.          When did you start taking these medications: YESTERDAY    Which medication are you concerned about: TREMAYNE    Who prescribed you this medication: PRAVEENA ROUSSEAU    What are your concerns: PATIENT TOOK THE MEDICINE LAST NIGHT AT 10P BY MIDNIGHT PATIENT COULD BARLEY WHISPER. PATIENT CALLED PHARMACY AND THEY ADVISED HER TO NOT TAKE THE MEDICINE. PATIENT WOULD LIKE TO KNOW YOUR THOUGHTS. PLEASE ADVISE. THANK YOU    How long have you had these concerns: SINCE LAST NIGHT  "

## 2023-02-16 NOTE — TELEPHONE ENCOUNTER
Spoke with Mrs. Sinhg and she states someone called her from the office.  Unsure of who and nothing found in her chart.  I advised it could have been Dr. Gagnon's office calling to move her appointment up due to her PM having <3months of battery life. I advised I would call and work on getting appointment moved.

## 2023-02-17 ENCOUNTER — TELEPHONE (OUTPATIENT)
Dept: CARDIOLOGY | Facility: CLINIC | Age: 68
End: 2023-02-17
Payer: MEDICARE

## 2023-02-17 NOTE — TELEPHONE ENCOUNTER
Pt said when she took the xarelto and 2 hours later she could barley whisper. Pt said when she woke up she could barley talk. She called pharmacy and they told her stop taking it. Pt said no other symptoms . Please advise?

## 2023-03-13 ENCOUNTER — OFFICE VISIT (OUTPATIENT)
Dept: CARDIOLOGY | Facility: CLINIC | Age: 68
End: 2023-03-13
Payer: MEDICARE

## 2023-03-13 VITALS
OXYGEN SATURATION: 98 % | BODY MASS INDEX: 34.96 KG/M2 | DIASTOLIC BLOOD PRESSURE: 60 MMHG | SYSTOLIC BLOOD PRESSURE: 130 MMHG | HEIGHT: 62 IN | WEIGHT: 190 LBS | HEART RATE: 78 BPM

## 2023-03-13 DIAGNOSIS — Z72.0 TOBACCO ABUSE: ICD-10-CM

## 2023-03-13 DIAGNOSIS — I44.2 COMPLETE HEART BLOCK: Primary | ICD-10-CM

## 2023-03-13 DIAGNOSIS — I50.32 CHRONIC DIASTOLIC CONGESTIVE HEART FAILURE: ICD-10-CM

## 2023-03-13 DIAGNOSIS — I48.0 PAROXYSMAL ATRIAL FIBRILLATION: ICD-10-CM

## 2023-03-13 DIAGNOSIS — I44.2 COMPLETE HEART BLOCK: ICD-10-CM

## 2023-03-13 DIAGNOSIS — Z95.0 PRESENCE OF CARDIAC PACEMAKER: Primary | ICD-10-CM

## 2023-03-13 DIAGNOSIS — I47.1 ATRIAL TACHYCARDIA: ICD-10-CM

## 2023-03-13 PROCEDURE — 99214 OFFICE O/P EST MOD 30 MIN: CPT | Performed by: INTERNAL MEDICINE

## 2023-03-13 PROCEDURE — 93281 PM DEVICE PROGR EVAL MULTI: CPT | Performed by: INTERNAL MEDICINE

## 2023-03-13 RX ORDER — ASPIRIN 325 MG
325 TABLET ORAL DAILY
COMMUNITY

## 2023-03-13 NOTE — H&P (VIEW-ONLY)
"    Subjective:     Encounter Date:03/13/2023      Patient ID: Мария Singh is a 67 y.o.  white female from [place], [place].    HCP: TAYLOR Veliz    Chief Complaint: No chief complaint on file.    Problem List:  1. Paroxysmal atrial fibrillation  a. CHADsVasc 3, anticoagulated with Eliquis  b. GI complaints with Eliquis, switched to Xarelto  c. Pacemaker implantation 2008  d. Status post AV node ablation  e. [employer] biventricular pacemaker generator change July 2020  f. On flecainide  2. Complete heart block  3. Hypertension  4. Hyperlipidemia  5. Type 2 diabetes mellitus; hemoglobin A1c 7.6% February 2023  6. Chronic diastolic heart failure  a. Echocardiogram 7/10/2017: EF 70-75% with normal valvular morphology  7. Anemia  8. Tobacco use; 1 pack/day x 43 years  9. Surgical history:  a. Cholecystectomy  b. Hysterectomy  c. Pacemaker implant  d. AV node ablation    Allergies   Allergen Reactions   • Metformin Diarrhea and Swelling   • Cephalexin Rash   • Influenza Virus Vaccine Swelling   • Sulfa Antibiotics Rash   • Trimethoprim Rash       Current Outpatient Medications   Medication Instructions   • ALPRAZolam (XANAX) 1 MG tablet Oral   • atorvastatin (LIPITOR) 10 mg, Oral, Daily   • Bydureon BCise 2 MG/0.85ML auto-injector injection No dose, route, or frequency recorded.   • estradiol (ESTRACE) 2 MG tablet Oral, Daily   • flecainide (TAMBOCOR) 50 mg, Oral, 2 Times Daily   • furosemide (LASIX) 20 mg, Oral, Daily   • glucose blood (ONE TOUCH ULTRA TEST) test strip Check blood sugar X 3/day   • glucose blood test strip 3 Times Daily   • Insulin Pen Needle 32G X 4 MM misc Does not apply   • Insulin Syringe-Needle U-100 30G X 5/16\" 0.5 ML misc Uses daily   • Lancets (ONETOUCH ULTRASOFT) lancets Does not apply   • Lancets (ONETOUCH ULTRASOFT) lancets Uses X 3 per day   • oxyCODONE-acetaminophen (PERCOCET) 7.5-325 MG per tablet No dose, route, or frequency recorded.   • potassium chloride 10 MEQ " CR tablet 10 mEq, Oral, Daily   • rivaroxaban (XARELTO) 20 mg, Oral, Daily   • sertraline (ZOLOFT) 100 MG tablet No dose, route, or frequency recorded.         HISTORY OF PRESENT ILLNESS:  The patient is here for a 5-month follow-up and is close to DAVID on [facility] biventricular pacemaker.      ROS   All other systems reviewed and otherwise negative.    Procedures       Objective:       There were no vitals filed for this visit.  There is no height or weight on file to calculate BMI.    Constitutional:       Appearance: Healthy appearance. Not in distress.   Neck:      Vascular: No JVR. JVD normal.   Pulmonary:      Effort: Pulmonary effort is normal.      Breath sounds: Normal breath sounds. No wheezing. No rhonchi. No rales.   Chest:      Chest wall: Not tender to palpatation.   Cardiovascular:      PMI at left midclavicular line. Normal rate. Regular rhythm. Normal S1. Normal S2.      Murmurs: There is no murmur.      No gallop. No click. No rub.   Pulses:     Intact distal pulses.   Edema:     Peripheral edema absent.   Abdominal:      General: Bowel sounds are normal.      Palpations: Abdomen is soft.      Tenderness: There is no abdominal tenderness.   Musculoskeletal: Normal range of motion.         General: No tenderness. Skin:     General: Skin is warm and dry.   Neurological:      General: No focal deficit present.      Mental Status: Alert and oriented to person, place and time.           Lab Review:   Lab Results   Component Value Date    GLUCOSE 145 (H) 08/09/2022    BUN 18 08/09/2022    CREATININE 1.01 (H) 08/09/2022    BCR 18 08/09/2022    CO2 24 08/09/2022    CALCIUM 8.9 08/09/2022    PROTENTOTREF 6.7 08/09/2022    ALBUMIN 3.8 08/09/2022    LABIL2 1.3 08/09/2022    AST 22 08/09/2022    ALT 16 08/09/2022       Lab Results   Component Value Date    WBC 5.3 08/09/2022    HGB 8.4 (L) 08/09/2022    HCT 29.1 08/15/2022    MCV 82 08/09/2022     08/09/2022       Lab Results   Component Value Date    HGBA1C  7.6 (H) 02/14/2023       Lab Results   Component Value Date    TRIG 153 (H) 09/08/2020    TRIG 162 (H) 11/07/2019     Lab Results   Component Value Date    HDL 44 (L) 09/08/2020    HDL 39 (L) 11/07/2019     Lab Results   Component Value Date    LDL 54 09/08/2020    LDL 44 11/07/2019     Advance Care Planning   ACP discussion was declined by the patient. Patient does not have an advance directive, declines further assistance.             Assessment:     Overall continued acceptable course with no new interim cardiopulmonary complaints with acceptable functional status. We will defer additional diagnostic or therapeutic intervention from a cardiac perspective at this time.     Diagnosis Plan   1. Complete heart block (HCC)   pacemaker dependent.    Resynchronization pacemaker in situ.    Device is at DAVID.    LV pacing threshold has chronically been exceedingly high.  Has required elective pulse generator replacements every couple of years.    Discussed with her the risk-benefit profile of providing her with a new LV lead.  I think this is certainly worth attempting.  She has a bipolar LV lead presently.  A moderate quadripolar lead in a optimal branch may give her battery longevity of a decade or more.  This would be a substantial improvement for her in my opinion certainly worth the small incremental risk of venous access and coronary sinus cannulation.  She agrees.  This is how she wishes to proceed.    Plan for pocket revision and relocation of the device pocket out of her left breast into the prepectoral space.    No medications to hold    [employer]    Moderate sedation    The patient and I made a mutually shared decision ( shared decision making model ) that the patient would be best served by proceeding with the above invasive heart procedure.  This is a high risk invasive cardiac procedure which comes with significant risk of morbidity and mortality.  This patient has significant underlying  heart  disease.  Мария Singh understands these risks and   has made an informed decision to proceed.        2. Chronic diastolic congestive heart failure (HCC)   stable.  Euvolemic.  Doing well.      3. Paroxysmal atrial fibrillation (HCC)   rhythm control strategy.  Flecainide.  Sinus rhythm today.    Intolerant of Xarelto as well as Eliquis.    Discussed with her the option of a watchman.    For now she is taking an aspirin.    Long discussion with her that she is really unprotected at this time.  And aspirin will confer no incremental protection from cardioembolic stroke from A-fib.  She understands this.    Gave her a watchman pamphlet.    Discussed with her Pradaxa versus Savaysa as well.    She is also had a bad experience previously with warfarin.    We will revisit this at the time of her procedure.      4. Tobacco abuse  Ongoing Tobacco Abuse Counseling:    This patient currently uses tobacco products.  We discussed that tobacco abuse is an addiction and is strongly linked with poor health outcomes such as heart disease, stroke, vascular disease, cancer and premature death.  I strongly advised the patient that immediate cessation of tobacco is critical to preserving, maintaining and improving their health.  We discussed various methods of tobacco abuse cessation, including counseling and various pharmacologic and non-pharmacologic therapies. We spent over 10 minutes discussing all options pertinent to tobacco cessation.  The patient voiced a clear understanding of these risks and these medical facts regarding tobacco abuse.            I spent 48 minutes in consultation with this patient which included more than 65% of this time in direct face-to-face counseling, physical examination and discussion of my assessment and findings and this shared decision making with the patient.  The remainder of the time not spent face-to-face was performing one, some or all of the following actions: preparing to see the  patient (e.g. reviewing tests, prior clinicians' notes, etc), ordering medications, tests or procedures, coordination of care, discussion of the plan with other healthcare providers, documenting clinical information in epic as well as independently interpreting results and communication of these results to the patient family and/or caregiver(s).  Please note that this explicitly excludes time spent on other separate billable services such as performing procedures or test interpretation, when applicable.

## 2023-03-13 NOTE — PROGRESS NOTES
"    Subjective:     Encounter Date:03/13/2023      Patient ID: Мария Singh is a 67 y.o.  white female from [place], [place].    HCP: TAYLOR Veliz    Chief Complaint: No chief complaint on file.    Problem List:  1. Paroxysmal atrial fibrillation  a. CHADsVasc 3, anticoagulated with Eliquis  b. GI complaints with Eliquis, switched to Xarelto  c. Pacemaker implantation 2008  d. Status post AV node ablation  e. [employer] biventricular pacemaker generator change July 2020  f. On flecainide  2. Complete heart block  3. Hypertension  4. Hyperlipidemia  5. Type 2 diabetes mellitus; hemoglobin A1c 7.6% February 2023  6. Chronic diastolic heart failure  a. Echocardiogram 7/10/2017: EF 70-75% with normal valvular morphology  7. Anemia  8. Tobacco use; 1 pack/day x 43 years  9. Surgical history:  a. Cholecystectomy  b. Hysterectomy  c. Pacemaker implant  d. AV node ablation    Allergies   Allergen Reactions   • Metformin Diarrhea and Swelling   • Cephalexin Rash   • Influenza Virus Vaccine Swelling   • Sulfa Antibiotics Rash   • Trimethoprim Rash       Current Outpatient Medications   Medication Instructions   • ALPRAZolam (XANAX) 1 MG tablet Oral   • atorvastatin (LIPITOR) 10 mg, Oral, Daily   • Bydureon BCise 2 MG/0.85ML auto-injector injection No dose, route, or frequency recorded.   • estradiol (ESTRACE) 2 MG tablet Oral, Daily   • flecainide (TAMBOCOR) 50 mg, Oral, 2 Times Daily   • furosemide (LASIX) 20 mg, Oral, Daily   • glucose blood (ONE TOUCH ULTRA TEST) test strip Check blood sugar X 3/day   • glucose blood test strip 3 Times Daily   • Insulin Pen Needle 32G X 4 MM misc Does not apply   • Insulin Syringe-Needle U-100 30G X 5/16\" 0.5 ML misc Uses daily   • Lancets (ONETOUCH ULTRASOFT) lancets Does not apply   • Lancets (ONETOUCH ULTRASOFT) lancets Uses X 3 per day   • oxyCODONE-acetaminophen (PERCOCET) 7.5-325 MG per tablet No dose, route, or frequency recorded.   • potassium chloride 10 MEQ " CR tablet 10 mEq, Oral, Daily   • rivaroxaban (XARELTO) 20 mg, Oral, Daily   • sertraline (ZOLOFT) 100 MG tablet No dose, route, or frequency recorded.         HISTORY OF PRESENT ILLNESS:  The patient is here for a 5-month follow-up and is close to DAVID on [facility] biventricular pacemaker.      ROS   All other systems reviewed and otherwise negative.    Procedures       Objective:       There were no vitals filed for this visit.  There is no height or weight on file to calculate BMI.    Constitutional:       Appearance: Healthy appearance. Not in distress.   Neck:      Vascular: No JVR. JVD normal.   Pulmonary:      Effort: Pulmonary effort is normal.      Breath sounds: Normal breath sounds. No wheezing. No rhonchi. No rales.   Chest:      Chest wall: Not tender to palpatation.   Cardiovascular:      PMI at left midclavicular line. Normal rate. Regular rhythm. Normal S1. Normal S2.      Murmurs: There is no murmur.      No gallop. No click. No rub.   Pulses:     Intact distal pulses.   Edema:     Peripheral edema absent.   Abdominal:      General: Bowel sounds are normal.      Palpations: Abdomen is soft.      Tenderness: There is no abdominal tenderness.   Musculoskeletal: Normal range of motion.         General: No tenderness. Skin:     General: Skin is warm and dry.   Neurological:      General: No focal deficit present.      Mental Status: Alert and oriented to person, place and time.           Lab Review:   Lab Results   Component Value Date    GLUCOSE 145 (H) 08/09/2022    BUN 18 08/09/2022    CREATININE 1.01 (H) 08/09/2022    BCR 18 08/09/2022    CO2 24 08/09/2022    CALCIUM 8.9 08/09/2022    PROTENTOTREF 6.7 08/09/2022    ALBUMIN 3.8 08/09/2022    LABIL2 1.3 08/09/2022    AST 22 08/09/2022    ALT 16 08/09/2022       Lab Results   Component Value Date    WBC 5.3 08/09/2022    HGB 8.4 (L) 08/09/2022    HCT 29.1 08/15/2022    MCV 82 08/09/2022     08/09/2022       Lab Results   Component Value Date    HGBA1C  7.6 (H) 02/14/2023       Lab Results   Component Value Date    TRIG 153 (H) 09/08/2020    TRIG 162 (H) 11/07/2019     Lab Results   Component Value Date    HDL 44 (L) 09/08/2020    HDL 39 (L) 11/07/2019     Lab Results   Component Value Date    LDL 54 09/08/2020    LDL 44 11/07/2019     Advance Care Planning   ACP discussion was declined by the patient. Patient does not have an advance directive, declines further assistance.             Assessment:     Overall continued acceptable course with no new interim cardiopulmonary complaints with acceptable functional status. We will defer additional diagnostic or therapeutic intervention from a cardiac perspective at this time.     Diagnosis Plan   1. Complete heart block (HCC)   pacemaker dependent.    Resynchronization pacemaker in situ.    Device is at DAVID.    LV pacing threshold has chronically been exceedingly high.  Has required elective pulse generator replacements every couple of years.    Discussed with her the risk-benefit profile of providing her with a new LV lead.  I think this is certainly worth attempting.  She has a bipolar LV lead presently.  A moderate quadripolar lead in a optimal branch may give her battery longevity of a decade or more.  This would be a substantial improvement for her in my opinion certainly worth the small incremental risk of venous access and coronary sinus cannulation.  She agrees.  This is how she wishes to proceed.    Plan for pocket revision and relocation of the device pocket out of her left breast into the prepectoral space.    No medications to hold    [employer]    Moderate sedation    The patient and I made a mutually shared decision ( shared decision making model ) that the patient would be best served by proceeding with the above invasive heart procedure.  This is a high risk invasive cardiac procedure which comes with significant risk of morbidity and mortality.  This patient has significant underlying  heart  disease.  Мария Singh understands these risks and   has made an informed decision to proceed.        2. Chronic diastolic congestive heart failure (HCC)   stable.  Euvolemic.  Doing well.      3. Paroxysmal atrial fibrillation (HCC)   rhythm control strategy.  Flecainide.  Sinus rhythm today.    Intolerant of Xarelto as well as Eliquis.    Discussed with her the option of a watchman.    For now she is taking an aspirin.    Long discussion with her that she is really unprotected at this time.  And aspirin will confer no incremental protection from cardioembolic stroke from A-fib.  She understands this.    Gave her a watchman pamphlet.    Discussed with her Pradaxa versus Savaysa as well.    She is also had a bad experience previously with warfarin.    We will revisit this at the time of her procedure.      4. Tobacco abuse  Ongoing Tobacco Abuse Counseling:    This patient currently uses tobacco products.  We discussed that tobacco abuse is an addiction and is strongly linked with poor health outcomes such as heart disease, stroke, vascular disease, cancer and premature death.  I strongly advised the patient that immediate cessation of tobacco is critical to preserving, maintaining and improving their health.  We discussed various methods of tobacco abuse cessation, including counseling and various pharmacologic and non-pharmacologic therapies. We spent over 10 minutes discussing all options pertinent to tobacco cessation.  The patient voiced a clear understanding of these risks and these medical facts regarding tobacco abuse.            I spent 48 minutes in consultation with this patient which included more than 65% of this time in direct face-to-face counseling, physical examination and discussion of my assessment and findings and this shared decision making with the patient.  The remainder of the time not spent face-to-face was performing one, some or all of the following actions: preparing to see the  patient (e.g. reviewing tests, prior clinicians' notes, etc), ordering medications, tests or procedures, coordination of care, discussion of the plan with other healthcare providers, documenting clinical information in epic as well as independently interpreting results and communication of these results to the patient family and/or caregiver(s).  Please note that this explicitly excludes time spent on other separate billable services such as performing procedures or test interpretation, when applicable.

## 2023-03-14 ENCOUNTER — PREP FOR SURGERY (OUTPATIENT)
Dept: OTHER | Facility: HOSPITAL | Age: 68
End: 2023-03-14
Payer: MEDICARE

## 2023-03-14 DIAGNOSIS — I44.2 COMPLETE HEART BLOCK: Primary | ICD-10-CM

## 2023-03-14 DIAGNOSIS — I50.32 CHRONIC DIASTOLIC CONGESTIVE HEART FAILURE: ICD-10-CM

## 2023-03-14 RX ORDER — SODIUM CHLORIDE 0.9 % (FLUSH) 0.9 %
10 SYRINGE (ML) INJECTION AS NEEDED
Status: CANCELLED | OUTPATIENT
Start: 2023-03-14

## 2023-03-14 RX ORDER — ONDANSETRON 2 MG/ML
4 INJECTION INTRAMUSCULAR; INTRAVENOUS EVERY 6 HOURS PRN
Status: CANCELLED | OUTPATIENT
Start: 2023-03-14

## 2023-03-14 RX ORDER — NITROGLYCERIN 0.4 MG/1
0.4 TABLET SUBLINGUAL
Status: CANCELLED | OUTPATIENT
Start: 2023-03-14

## 2023-03-14 RX ORDER — CLINDAMYCIN PHOSPHATE 900 MG/50ML
900 INJECTION INTRAVENOUS
Status: CANCELLED | OUTPATIENT
Start: 2023-03-14

## 2023-03-14 RX ORDER — SODIUM CHLORIDE 9 MG/ML
40 INJECTION, SOLUTION INTRAVENOUS AS NEEDED
Status: CANCELLED | OUTPATIENT
Start: 2023-03-14

## 2023-03-14 RX ORDER — SODIUM CHLORIDE 0.9 % (FLUSH) 0.9 %
3 SYRINGE (ML) INJECTION EVERY 12 HOURS SCHEDULED
Status: CANCELLED | OUTPATIENT
Start: 2023-03-14

## 2023-03-14 RX ORDER — ACETAMINOPHEN 325 MG/1
650 TABLET ORAL EVERY 4 HOURS PRN
Status: CANCELLED | OUTPATIENT
Start: 2023-03-14

## 2023-03-30 ENCOUNTER — HOSPITAL ENCOUNTER (OUTPATIENT)
Facility: HOSPITAL | Age: 68
Discharge: HOME | End: 2023-03-30
Attending: INTERNAL MEDICINE | Admitting: INTERNAL MEDICINE
Payer: MEDICARE

## 2023-03-30 ENCOUNTER — APPOINTMENT (OUTPATIENT)
Dept: GENERAL RADIOLOGY | Facility: HOSPITAL | Age: 68
End: 2023-03-30
Payer: MEDICARE

## 2023-03-30 VITALS
HEART RATE: 88 BPM | SYSTOLIC BLOOD PRESSURE: 129 MMHG | WEIGHT: 188.8 LBS | OXYGEN SATURATION: 97 % | HEIGHT: 62 IN | TEMPERATURE: 98.1 F | DIASTOLIC BLOOD PRESSURE: 73 MMHG | RESPIRATION RATE: 12 BRPM | BODY MASS INDEX: 34.74 KG/M2

## 2023-03-30 DIAGNOSIS — I50.32 CHRONIC DIASTOLIC CONGESTIVE HEART FAILURE: ICD-10-CM

## 2023-03-30 DIAGNOSIS — I44.2 COMPLETE HEART BLOCK: ICD-10-CM

## 2023-03-30 DIAGNOSIS — I47.1 ATRIAL TACHYCARDIA: ICD-10-CM

## 2023-03-30 DIAGNOSIS — Z95.0 PRESENCE OF CARDIAC PACEMAKER: ICD-10-CM

## 2023-03-30 DIAGNOSIS — I48.0 PAROXYSMAL ATRIAL FIBRILLATION: ICD-10-CM

## 2023-03-30 LAB
ANION GAP SERPL CALCULATED.3IONS-SCNC: 9 MMOL/L (ref 5–15)
BUN SERPL-MCNC: 17 MG/DL (ref 8–23)
BUN/CREAT SERPL: 22.1 (ref 7–25)
CALCIUM SPEC-SCNC: 8.9 MG/DL (ref 8.6–10.5)
CHLORIDE SERPL-SCNC: 106 MMOL/L (ref 98–107)
CO2 SERPL-SCNC: 24 MMOL/L (ref 22–29)
CREAT SERPL-MCNC: 0.77 MG/DL (ref 0.57–1)
DEPRECATED RDW RBC AUTO: 45.1 FL (ref 37–54)
EGFRCR SERPLBLD CKD-EPI 2021: 84.7 ML/MIN/1.73
ERYTHROCYTE [DISTWIDTH] IN BLOOD BY AUTOMATED COUNT: 13.1 % (ref 12.3–15.4)
GLUCOSE SERPL-MCNC: 170 MG/DL (ref 65–99)
HCT VFR BLD AUTO: 34.3 % (ref 34–46.6)
HGB BLD-MCNC: 11.1 G/DL (ref 12–15.9)
MAGNESIUM SERPL-MCNC: 2.5 MG/DL (ref 1.6–2.4)
MCH RBC QN AUTO: 30.5 PG (ref 26.6–33)
MCHC RBC AUTO-ENTMCNC: 32.4 G/DL (ref 31.5–35.7)
MCV RBC AUTO: 94.2 FL (ref 79–97)
PLATELET # BLD AUTO: 201 10*3/MM3 (ref 140–450)
PMV BLD AUTO: 10.5 FL (ref 6–12)
POTASSIUM SERPL-SCNC: 4.2 MMOL/L (ref 3.5–5.2)
RBC # BLD AUTO: 3.64 10*6/MM3 (ref 3.77–5.28)
SODIUM SERPL-SCNC: 139 MMOL/L (ref 136–145)
WBC NRBC COR # BLD: 6 10*3/MM3 (ref 3.4–10.8)

## 2023-03-30 PROCEDURE — 33229 REMV&REPLC PM GEN MULT LEADS: CPT | Performed by: INTERNAL MEDICINE

## 2023-03-30 PROCEDURE — 25010000002 FENTANYL CITRATE (PF) 50 MCG/ML SOLUTION: Performed by: INTERNAL MEDICINE

## 2023-03-30 PROCEDURE — 99152 MOD SED SAME PHYS/QHP 5/>YRS: CPT | Performed by: INTERNAL MEDICINE

## 2023-03-30 PROCEDURE — C1892 INTRO/SHEATH,FIXED,PEEL-AWAY: HCPCS | Performed by: INTERNAL MEDICINE

## 2023-03-30 PROCEDURE — C1889 IMPLANT/INSERT DEVICE, NOC: HCPCS | Performed by: INTERNAL MEDICINE

## 2023-03-30 PROCEDURE — 25010000002 ONDANSETRON PER 1 MG: Performed by: INTERNAL MEDICINE

## 2023-03-30 PROCEDURE — 25010000002 DIPHENHYDRAMINE PER 50 MG: Performed by: INTERNAL MEDICINE

## 2023-03-30 PROCEDURE — C1894 INTRO/SHEATH, NON-LASER: HCPCS | Performed by: INTERNAL MEDICINE

## 2023-03-30 PROCEDURE — 80048 BASIC METABOLIC PNL TOTAL CA: CPT | Performed by: PHYSICIAN ASSISTANT

## 2023-03-30 PROCEDURE — 25010000002 MIDAZOLAM PER 1 MG: Performed by: INTERNAL MEDICINE

## 2023-03-30 PROCEDURE — 33225 L VENTRIC PACING LEAD ADD-ON: CPT | Performed by: INTERNAL MEDICINE

## 2023-03-30 PROCEDURE — C1769 GUIDE WIRE: HCPCS | Performed by: INTERNAL MEDICINE

## 2023-03-30 PROCEDURE — 83735 ASSAY OF MAGNESIUM: CPT | Performed by: PHYSICIAN ASSISTANT

## 2023-03-30 PROCEDURE — 25510000001 IOPAMIDOL PER 1 ML: Performed by: INTERNAL MEDICINE

## 2023-03-30 PROCEDURE — C1900 LEAD, CORONARY VENOUS: HCPCS | Performed by: INTERNAL MEDICINE

## 2023-03-30 PROCEDURE — C2621 PMKR, OTHER THAN SING/DUAL: HCPCS | Performed by: INTERNAL MEDICINE

## 2023-03-30 PROCEDURE — C1725 CATH, TRANSLUMIN NON-LASER: HCPCS | Performed by: INTERNAL MEDICINE

## 2023-03-30 PROCEDURE — 93005 ELECTROCARDIOGRAM TRACING: CPT | Performed by: INTERNAL MEDICINE

## 2023-03-30 PROCEDURE — 85027 COMPLETE CBC AUTOMATED: CPT | Performed by: PHYSICIAN ASSISTANT

## 2023-03-30 PROCEDURE — 0 LIDOCAINE 1 % SOLUTION: Performed by: INTERNAL MEDICINE

## 2023-03-30 PROCEDURE — 71046 X-RAY EXAM CHEST 2 VIEWS: CPT

## 2023-03-30 PROCEDURE — 99153 MOD SED SAME PHYS/QHP EA: CPT | Performed by: INTERNAL MEDICINE

## 2023-03-30 DEVICE — PACE/SENSE LEAD
Type: IMPLANTABLE DEVICE | Status: FUNCTIONAL
Brand: ACUITY™ X4 SPIRAL L

## 2023-03-30 DEVICE — CARDIAC RESYNCHRONIZATION THERAPY PACEMAKER
Type: IMPLANTABLE DEVICE | Status: FUNCTIONAL
Brand: VALITUDE™ X4 CRT-P

## 2023-03-30 DEVICE — ENV PM AIGISRX ANTIBAC RESORB 2.9X3.3IN LG: Type: IMPLANTABLE DEVICE | Status: FUNCTIONAL

## 2023-03-30 RX ORDER — BUPIVACAINE HYDROCHLORIDE 5 MG/ML
INJECTION, SOLUTION PERINEURAL
Status: DISCONTINUED | OUTPATIENT
Start: 2023-03-30 | End: 2023-03-30 | Stop reason: HOSPADM

## 2023-03-30 RX ORDER — LIDOCAINE HYDROCHLORIDE 10 MG/ML
INJECTION, SOLUTION INFILTRATION; PERINEURAL
Status: DISCONTINUED | OUTPATIENT
Start: 2023-03-30 | End: 2023-03-30 | Stop reason: HOSPADM

## 2023-03-30 RX ORDER — MIDAZOLAM HYDROCHLORIDE 1 MG/ML
INJECTION INTRAMUSCULAR; INTRAVENOUS
Status: DISCONTINUED | OUTPATIENT
Start: 2023-03-30 | End: 2023-03-30 | Stop reason: HOSPADM

## 2023-03-30 RX ORDER — FENTANYL CITRATE 50 UG/ML
INJECTION, SOLUTION INTRAMUSCULAR; INTRAVENOUS
Status: DISCONTINUED | OUTPATIENT
Start: 2023-03-30 | End: 2023-03-30 | Stop reason: HOSPADM

## 2023-03-30 RX ORDER — CLINDAMYCIN PHOSPHATE 900 MG/50ML
900 INJECTION INTRAVENOUS
Status: COMPLETED | OUTPATIENT
Start: 2023-03-30 | End: 2023-03-30

## 2023-03-30 RX ORDER — IBUPROFEN 600 MG/1
600 TABLET ORAL EVERY 6 HOURS SCHEDULED
Status: DISCONTINUED | OUTPATIENT
Start: 2023-03-30 | End: 2023-03-30 | Stop reason: HOSPADM

## 2023-03-30 RX ORDER — SODIUM CHLORIDE 9 MG/ML
INJECTION, SOLUTION INTRAVENOUS
Status: COMPLETED | OUTPATIENT
Start: 2023-03-30 | End: 2023-03-30

## 2023-03-30 RX ORDER — DIPHENHYDRAMINE HYDROCHLORIDE 50 MG/ML
INJECTION INTRAMUSCULAR; INTRAVENOUS
Status: DISCONTINUED | OUTPATIENT
Start: 2023-03-30 | End: 2023-03-30 | Stop reason: HOSPADM

## 2023-03-30 RX ORDER — SODIUM CHLORIDE 0.9 % (FLUSH) 0.9 %
3 SYRINGE (ML) INJECTION EVERY 12 HOURS SCHEDULED
Status: DISCONTINUED | OUTPATIENT
Start: 2023-03-30 | End: 2023-03-30 | Stop reason: HOSPADM

## 2023-03-30 RX ORDER — ONDANSETRON 2 MG/ML
4 INJECTION INTRAMUSCULAR; INTRAVENOUS EVERY 6 HOURS PRN
Status: DISCONTINUED | OUTPATIENT
Start: 2023-03-30 | End: 2023-03-30 | Stop reason: HOSPADM

## 2023-03-30 RX ORDER — ACETAMINOPHEN 325 MG/1
650 TABLET ORAL EVERY 6 HOURS
Status: DISCONTINUED | OUTPATIENT
Start: 2023-03-30 | End: 2023-03-30 | Stop reason: HOSPADM

## 2023-03-30 RX ORDER — ONDANSETRON 2 MG/ML
INJECTION INTRAMUSCULAR; INTRAVENOUS
Status: DISCONTINUED | OUTPATIENT
Start: 2023-03-30 | End: 2023-03-30 | Stop reason: HOSPADM

## 2023-03-30 RX ORDER — ACETAMINOPHEN 325 MG/1
650 TABLET ORAL EVERY 4 HOURS PRN
Status: DISCONTINUED | OUTPATIENT
Start: 2023-03-30 | End: 2023-03-30 | Stop reason: HOSPADM

## 2023-03-30 RX ORDER — NITROGLYCERIN 0.4 MG/1
0.4 TABLET SUBLINGUAL
Status: DISCONTINUED | OUTPATIENT
Start: 2023-03-30 | End: 2023-03-30 | Stop reason: HOSPADM

## 2023-03-30 RX ORDER — SODIUM CHLORIDE 9 MG/ML
40 INJECTION, SOLUTION INTRAVENOUS AS NEEDED
Status: DISCONTINUED | OUTPATIENT
Start: 2023-03-30 | End: 2023-03-30 | Stop reason: HOSPADM

## 2023-03-30 RX ORDER — SODIUM CHLORIDE 0.9 % (FLUSH) 0.9 %
10 SYRINGE (ML) INJECTION AS NEEDED
Status: DISCONTINUED | OUTPATIENT
Start: 2023-03-30 | End: 2023-03-30 | Stop reason: HOSPADM

## 2023-03-30 RX ADMIN — CLINDAMYCIN PHOSPHATE 900 MG: 900 INJECTION, SOLUTION INTRAVENOUS at 12:25

## 2023-03-30 NOTE — OP NOTE
PROCEDURE:  ATRIOBIVENTRICULAR pacemaker (CRT-pacemaker) UPGRADE    OPERATION PERFORMED:     1. Explantation of [employer] resynchronization pacemaker pulse generator at elective replacement indicator.    2. Testing of retained leads:        A. Atrial lead [employer] model 4470, serial #[id number].        B. Right ventricular lead [employer] model 4471, [id number].          C. Left ventricular lead [employer] model 4543, 360298.  This lead was abandoned today in the floor the pocket.    3. Implantation of [employer] pulse generator with serial number [employer] model U128 with serial #[id number].    4. Implantation of new leads:          D. Left ventricular lead [employer] model 4677, 86 cm, [id number].    RADIATION EXPOSURE: 35.3 minutes and 1231 milliGray.                ATTENDING SURGEON: Hipolito Gagnon DO    MODERATE SEDATION FOR PROCEDURE:  Moderate sedation was given during this procedure.    I supervised and directed RN to administer this sedation.  This staff member also monitored the patient's hemodynamic and respiratory status and response to these medications.  Please see the full detailed procedure report generated by the electrophysiology laboratory staff.  The patient tolerated moderate sedation well.  There were no complications regarding sedation.  The total dose of fentanyl was 225 mcg and the total dose of midazolam was 18 mg.  The total dose of Brevital was 120 mg.  First sedation was administered at 1227 and continued through 1438.    ESTIMATED BLOOD LOSS: less than 20cc    COMPLICATIONS: None    TIME OUT: Time out was completed with verification of the correct patient identity, procedure to be performed, procedure site and implanted equipment.    INDICATION FOR PROCEDURE:  Briefly,Мария Sinhg is a 67 y.o. female with a history of nonischemic cardiomyopathy with ejection fraction of previously 40% who underwent implantation of a resynchronization pacemaker  and AV node ablation with normalization of LV systolic function.  She has reached elective replacement indicator due to battery depletion.  Her LV pacing threshold was exceedingly high and after several years of elective pulse generator replacements after maybe 1-1/2 to 2 years battery longevity we decided to proceed with elective pulse generator replacement with an attempt at placing a new left ventricular lead to afford her better battery longevity and fewer procedures in the long run.  She had reached the elective replacement indicator and we scheduled this procedure electively as an outpatient.    The patient was able to give written informed consent after revisiting the key portions of the risk versus benefit profile of the procedure.  This discussion was framed by our lengthy conversations  (please see our detailed notes).  Patient verbalized strong understanding of this discussion and a strong desire to proceed with the procedure.  Please note that this detailed informed consent process utilized mutual and shared decision making process between all parties involved, principally the physician and patient, but also potentially with input from the patient's selected family and friends.    Please especially note that the patient has been on maximally tolerated doses of guideline directed medical therapy, including but not limited to: HF indicated beta-blocker (carvedilol, metoprolol succinate), ACE Inhibitor or Angiotensin receptor blocker.  Please note that for some of these medications the maximally tolerated dose may be zero.  The patient has not had a myocardial infarction within 40 days and has not had coronary revascularization within 90 days.    This patient who is a candidate for an ICD has a life expectancy greater than 12 months.    PROCEDURE AND FINDINGS:  The patient was brought to the electrophysiology suite in a post absorptive state.  Informed consent was obtained prior to the procedure and  confirmed.  Intravenous prophylactic antibiotics were administered and confirmed to be completely infused prior to the start of the procedure.  After the site of implantation was prepped and draped in the usual sterile fashion and after adequate anesthesia was given, the skin was infiltrated with 1% lidocaine and 0.5% bupivicaine 50/50 mixture.  The skin was incised with a #10 scalpel.  Blunt and electrosurgical dissection was carried out to the pulse generator pocket.  The leads were carefully isolated with blunt and electrosurgical dissection.  Careful attention was paid not to damage the leads.  The pulse generator was explanted and the pocket was copiously irrigated with antibiotic containing normal saline and subsequently observed.  Once adequate hemostasis was confirmed within the pocket, the leads were detached from the pulse generator.  The leads were tested for adequate sensing, impedances and pacing thresholds.    The axillary vein was accessed via a contrast guided Seldinger technique.  J tip 0.035 inch guide wires were introduced and their course through the venous system was confirmed by their presence under fluoroscopy in the inferior vena (excluding inadvertent arterial puncture.)      NEW LV LEAD:  A Shell third generation variable curve CS peel away sheath was brought to the field and placed into the venous system via over the wire technique.  Next the coronary sinus was cannulated with radiopaque contrast injection technique in an over the wire fashion.  Coronary venous angiography performed in multiple projections within the great cardiac vein.  This identified the possible target branch veins.  A lateral anatomic position vein was selected.  This target vein was cannulated with the [place] vein selector, the [place] LVI lead delivery sheath and 0.014 inch interventional wires.  The left ventricular lead was placed to the target vein over this 0.014 inch wire in an over the wire fashion.  Adequate  sensing and threshold parameters were obtained.  There was no evidence of diaphragmatic stimulation at 10 V output.  The Shell system was removed from the body in toto and the lead collar was advanced to the pectoral muscle and sutured with non absorbable  suture.  Tug testing of this lead confirmed stability of the lead and the length of the lead's slack was assessed as optimal with fluoroscopy.  The lead tips for all leads were cleaned and dried thoroughly.  The leads were attached to the appropriate ports on the pulse generator.  Tug testing was performed on all connections.  The device and leads were placed within the pocket such that the coiled redundant leads were posterior to the pulse generator.    ABANDONED LEAD:   The old LV lead was abandoned within the device pocket and sutured to the floor of the pocket securely with non-absorbable suture.       The lead tips for all leads were cleaned and dried thoroughly.  The leads were attached to the appropriate ports on the new pulse generator.  Tug testing was performed on all connections.  The device and leads were placed within the pocket such that the coiled redundant leads were posterior to the pulse generator.  Once again, the device was tested for adequate sensing, impedances and pacing thresholds.    The pulse generator was then sutured to the fascia in a medial location within the pocket using non absorbable suture.    A [employer] Tyrex antibiotic impregnated bio-absorbable pouch was placed within the pocket that including all of the redundant leads and pulse generator for the prophylaxis of surgical device infection.    The pocket was closed with two layers of suture using 2-0 then 3-0 Vicryl, followed by a layer of surgical adhesive and finally a sterile occlusive dressing.                       MEASURED DEVICE DATA:    Atrial lead                   sensin.8 mV                   impedance:           459 ohms                    Threshold:            0.8 volts at 0.4 ms    RV lead                   sensing:                       None                   pacing impedance:    420 ohms                   Threshold:                  1 volts at 0.4 ms    LV lead                                      impedance:               406  ohms                   Threshold:                  2.4 volts at 1.5 ms ring 3 to can unipolar (by far the best pacing shoulder vector)                                                        PROGRAMMED PARAMETERS:    Mode:                                 DDD  Lower Rate:                       60 pulses per minute  Upper Tracking Rate:      130 pulses per minute  Mode Switch Rate:           170 bpm    CONCLUSION:  Successful upgrade to CRT-pacemaker.    RECOMMENDATION:    Patient is to follow up with our clinic in approximately one week and then myself in 3 months.    No lovenox or heparin at any dose is to be given.    FOR THE PATIENT    Please do not lift more than 10 pounds or abduct the shoulder joint / or raise the arm above the shoulder for 6 weeks after device was implanted (this does not apply to subcutaneous ICDs).    Avoid activities that involve heavy lifting or rough contact that could result in blows to your implant site and to allow your incision time to heal.    No shower or getting device incision wet for 2 days post-operatively.    Keep wound exposed to air unless otherwise instructed, the surgical glue is the bandage.    No creams, lotions, or powders to incision.    Please avoid allowing bra strap or suspenders to lay over incision until completely healed.    Avoid hot tubs or pools until incision completely healed.    No driving for 24 hours post-operatively after device implant.    Call your doctor if you have any swelling, redness or discharge around your incision, notice anything unusual or unexpected, or you develop a fever that does not go away in two or three days.    Call your doctor if you hear any  beeping sounds / vibratory alerts from your device as this indicates your device needs to be checked immediately.    Carry your Medical Device ID Card with you at all times.  Please call our office () with any questions about the device or the wounds.

## 2023-03-30 NOTE — Clinical Note
-- DO NOT REPLY / DO NOT REPLY ALL --  -- Message is from [facility] ([facility]) --    General Patient Message: Please received Stool specimen kit in the mail but have questions as how to complete the test.Please call patient back with further instructions.     Caller Information       Type Contact Phone/Fax    09/13/2022 04:03 PM CDT Phone (Incoming) Betzy Adame (Self) [phone] (M)        Alternative phone number: none    Can a detailed message be left? Yes    Message Turnaround:     Is it Working Hours? Yes - Working Hours     [place]:    Please give this turnaround time to the caller:   \"This message will be sent to [state Provider's name]. The clinical team will fulfill your request as soon as they review your message.\"                 Generator connected.

## 2023-03-30 NOTE — INTERVAL H&P NOTE
"  H&P reviewed. The patient was examined and there are no changes to the H&P.       EP Pre-Procedure Report  Cardiovascular Laboratory  [facility]    Patient:  Мария Singh  :  1955  PCP:  Montserrat Flores APRN  PHONE:  [phone]    DATE: 3/30/2023      MEDICATIONS:  Prior to Admission medications    Medication Sig Start Date End Date Taking? Authorizing Provider   ALPRAZolam (XANAX) 1 MG tablet Take 1 tablet by mouth 3 (Three) Times a Day. 16   Keli Toro MD   aspirin 325 MG tablet Take 1 tablet by mouth Daily.    Keli Toro MD   atorvastatin (LIPITOR) 10 MG tablet Take 1 tablet by mouth Daily. 21   Keli Toro MD   Bydureon BCise 2 MG/0.85ML auto-injector injection 2 mg 1 (One) Time Per Week. 21   Keli Toro MD   estradiol (ESTRACE) 2 MG tablet Take  by mouth daily. 16   Keli Toro MD   flecainide (TAMBOCOR) 50 MG tablet Take 1 tablet by mouth 2 (Two) Times a Day. 21   Capo Lock MD   furosemide (LASIX) 20 MG tablet Take 1 tablet by mouth Daily.    Keli Toro MD   glucose blood (ONE TOUCH ULTRA TEST) test strip Check blood sugar X 3/day 16   Tabatha Schultz APRN   glucose blood test strip 3 (three) times a day. 16   Keli Toro MD   Insulin Pen Needle 32G X 4 MM misc  16   Keli Toro MD   Insulin Syringe-Needle U-100 30G X \" 0.5 ML misc Uses daily 16   Tabatha Schultz APRN   Lancets (ONETOUCH ULTRASOFT) lancets  16   Keli Toro MD   Lancets (ONETOUCH ULTRASOFT) lancets Uses X 3 per day 16   Tabatha Schultz APRN   oxyCODONE-acetaminophen (PERCOCET) 7.5-325 MG per tablet Every 6 (Six) Hours. 21   Keli Toro MD   potassium chloride 10 MEQ CR tablet Take 1 tablet by mouth Daily.    Keli Toro MD   sertraline (ZOLOFT) 100 MG tablet 1 tablet 2 (Two) Times a Day. 21   Provider, MD Keli "       Past medical & surgical history, social and family history reviewed in the electronic medical record.    Physical Exam:    Vitals: There were no vitals filed for this visit. There were no vitals filed for this visit.There is no height or weight on file to calculate BMI.    GENERAL: No apparent distress.  No significant changes since last exam.  CHEST: Clear to auscultation bilaterally no stridor no wheeze.  CV: S1, S2, Regular without Murmurs, Rubs or Gallops  EXTREMITIES: No edema.                CrCl cannot be calculated (Patient's most recent lab result is older than the maximum 30 days allowed.).    IMPRESSION:pacemaker battery at DAVID      PLAN:  Procedure to perform: BiV PPM relocation, Lead revision, Generator change        Electronically signed by STEPHEN Kidd, 03/30/23, 10:41 AM EDT.

## 2023-03-31 ENCOUNTER — CALL CENTER PROGRAMS (OUTPATIENT)
Dept: CALL CENTER | Facility: HOSPITAL | Age: 68
End: 2023-03-31
Payer: MEDICARE

## 2023-03-31 NOTE — OUTREACH NOTE
"PCI/Device Survey    Flowsheet Row Responses   Facility patient discharged from? [facility]   Procedure date 03/30/23   Procedure (if device, specify in description) Device   Device Description Implantation of [employer] pulse generator and new leads   Performing MD Dr. Hipolito Gagnon   Attempt successful? Yes   Call start time [phone]   Call end time 1113   Nursing interventions Patient education provided   Is the patient taking prescribed medications: ASA   Nursing intervention Reminded to continue to take prescribed medications, Nurse provided patient education   Does the patient have any of the following symptoms related to the cath/surgical site? --  [Left chest incision SHAUN, has no dressing in place. C/O \"watery blood oozing last night\" No further drainage. She denies s/sx of infection but does report being sore. Incision is with surgical adhesive over it, all edges are together per pt.]   Nursing intervention Patient education provided   Does the patient have an appointment scheduled with the cardiologist? Yes   If the patient is a current smoker, are they able to teach back resources for cessation? Not a smoker  [smokes less half pk/day]   Did the patient feel prepared to go home on the same day as the procedure? Yes   Is the patient satisfied with the same day discharge process? Yes   PCI/Device call completed Yes          Stephanie MORENO - Registered Nurse  "

## 2023-04-06 ENCOUNTER — OFFICE VISIT (OUTPATIENT)
Dept: CARDIOLOGY | Facility: CLINIC | Age: 68
End: 2023-04-06
Payer: MEDICARE

## 2023-04-06 DIAGNOSIS — I48.0 PAROXYSMAL ATRIAL FIBRILLATION: ICD-10-CM

## 2023-04-06 DIAGNOSIS — Z95.0 PRESENCE OF CARDIAC PACEMAKER: Primary | ICD-10-CM

## 2023-04-06 PROCEDURE — 99024 POSTOP FOLLOW-UP VISIT: CPT | Performed by: INTERNAL MEDICINE

## 2023-04-06 NOTE — PROGRESS NOTES
2023    Мария Singh, : 1955      Fever: No    Temperature if indicated:     Wound Location: Left Infraclavicular    Dressing Removed: Removed by MA/RN      Old Dressing Appearance:  Clean, dry    Wound Appearance: Redness []                  Drainage []                  Culture obtained []        Color: Clear     Consistency:      Amount: none         Gloves used, wound cleansed with sterile 4x4 and peroxide [x]       MD notified []     MD orders:   Antibiotic started []                Appointment for follow-up scheduled for 3 months post procedure []    Future Appointments   Date Time Provider Department Center   2023  3:15 PM Rodolfo Luna PA [facility]   8/15/2023  2:45 PM Capo Lock MD [facility]           Ana Louise MA, 23      MD Signature:______________________________ Completed By/Date:

## 2023-04-13 LAB
QT INTERVAL: 440 MS
QTC INTERVAL: 497 MS

## 2023-04-21 ENCOUNTER — TELEPHONE (OUTPATIENT)
Dept: CARDIOLOGY | Facility: CLINIC | Age: 68
End: 2023-04-21
Payer: MEDICARE

## 2023-04-21 NOTE — TELEPHONE ENCOUNTER
Mrs Singh says she feels like she isn't recovering from her implant as well as she previously did. She said she just has not energy. She went to [facility] with a fall and was scared she dislodged the lead. She said she didn't fall due to dizziness or syncope just lost her balance.   Attempted to get her to send a remote reading and was unsuccessful.  She is going to call [employer] home monitoring and get assistance on getting her monitor to work.    Remote reading received presenting EGM AS/BIV paced. DDD 60.  No events noted.  Reading in MUR[facility] for review.  She said before that rate response was on and was adjusted a couple of times.  She is 0% AP.  Rates ranging from  bpm.

## 2023-04-21 NOTE — TELEPHONE ENCOUNTER
"Mrs. Singh called and states she had some PM lead wires changed out about 3 weeks ago and she is just not getting her stamina back and she feels like she is \"floating or light feeling\".  She went to the ER at  [facility] on 04/11 after she had a fall and was afraid she dislodged her PM.  She states she did not fall due to dizziness or syncope just lost balance.  I have pulled the Er records.  Last night she states she has had a similar episode of feeling light and floating.  She is afraid the PM may not be on the right setting. I advised I would talk with Dr. heck and [facility] and call her back.  She verbalized understanding.    "

## 2023-04-30 PROCEDURE — 93296 REM INTERROG EVL PM/IDS: CPT | Performed by: INTERNAL MEDICINE

## 2023-04-30 PROCEDURE — 93294 REM INTERROG EVL PM/LDLS PM: CPT | Performed by: INTERNAL MEDICINE

## 2023-05-11 ENCOUNTER — TELEPHONE (OUTPATIENT)
Dept: CARDIOLOGY | Facility: CLINIC | Age: 68
End: 2023-05-11
Payer: MEDICARE

## 2023-05-11 ENCOUNTER — OFFICE VISIT (OUTPATIENT)
Dept: CARDIOLOGY | Facility: CLINIC | Age: 68
End: 2023-05-11
Payer: MEDICARE

## 2023-05-11 VITALS
DIASTOLIC BLOOD PRESSURE: 62 MMHG | OXYGEN SATURATION: 98 % | SYSTOLIC BLOOD PRESSURE: 132 MMHG | HEIGHT: 62 IN | HEART RATE: 70 BPM | TEMPERATURE: 98 F | BODY MASS INDEX: 35.7 KG/M2 | WEIGHT: 194 LBS | RESPIRATION RATE: 19 BRPM

## 2023-05-11 DIAGNOSIS — Z72.0 TOBACCO ABUSE: ICD-10-CM

## 2023-05-11 DIAGNOSIS — Z95.0 PRESENCE OF CARDIAC PACEMAKER: ICD-10-CM

## 2023-05-11 DIAGNOSIS — I48.0 PAROXYSMAL ATRIAL FIBRILLATION: ICD-10-CM

## 2023-05-11 DIAGNOSIS — I50.32 CHRONIC DIASTOLIC CONGESTIVE HEART FAILURE: Primary | ICD-10-CM

## 2023-05-11 DIAGNOSIS — E78.5 DYSLIPIDEMIA: ICD-10-CM

## 2023-05-11 PROCEDURE — 99214 OFFICE O/P EST MOD 30 MIN: CPT | Performed by: INTERNAL MEDICINE

## 2023-05-11 PROCEDURE — 93000 ELECTROCARDIOGRAM COMPLETE: CPT | Performed by: INTERNAL MEDICINE

## 2023-05-11 PROCEDURE — 1159F MED LIST DOCD IN RCRD: CPT | Performed by: INTERNAL MEDICINE

## 2023-05-11 PROCEDURE — 1160F RVW MEDS BY RX/DR IN RCRD: CPT | Performed by: INTERNAL MEDICINE

## 2023-05-11 NOTE — PROGRESS NOTES
[facility] [place]  [facility] CARDIOLOGY  [address] [place] [address]  Dept: [phone]  Dept Fax: [phone]    Мария Singh  1955    Follow Up Office Visit Note    History of Present Illness:  Мария Singh is a 67 y.o. female who presents to the clinic for Follow-up. Pacemaker- Her pacemaker was replaced by the end of March, she does feels tired and  fatigue and she thinks is related to programing her pacer, has a new wire on the LV< , we have contacted EP office and will see the PA at the EP clinic, next week    The following portions of the patient's history were reviewed and updated as appropriate: allergies, current medications, past family history, past medical history, past social history, past surgical history, and problem list.    Medications:  ALPRAZolam  aspirin  atorvastatin  Bydureon BCise auto-injector  estradiol  flecainide  furosemide  glucose blood  Insulin Pen Needle misc  Insulin Syringe-Needle U-100 misc  onetouch ultrasoft  oxyCODONE-acetaminophen  potassium chloride  sertraline    Subjective  Allergies   Allergen Reactions   • Cephalexin Rash   • Influenza Virus Vaccine Swelling   • Metformin Diarrhea and Swelling   • Sulfa Antibiotics Rash   • Trimethoprim Rash        Past Medical History:   Diagnosis Date   • Anxiety    • Atrial fibrillation    • Atrial tachycardia     S/P AV NODE ABLATION AND PPM   • Diabetes mellitus     Take 1 shot a week   • Hyperlipidemia        Past Surgical History:   Procedure Laterality Date   • ABLATION OF DYSRHYTHMIC FOCUS      Didn’t work   • CARDIAC ELECTROPHYSIOLOGY PROCEDURE N/A 3/30/2023    Procedure: Patient currently has a *BSC* pacemaker.  She will need an upgrade to a BIV pacemaker (new LV lead) within 2 weeks. DNS meds.;  Surgeon: Hipolito Gagnon DO;  Location: [facility] INVASIVE LOCATION;  Service: Cardiology;  Laterality: N/A;   • CHOLECYSTECTOMY     • COLONOSCOPY  09/30/2022   • HYSTERECTOMY      Total  "abdominal with removal of both ovaries   • INSERT / REPLACE / REMOVE PACEMAKER  03 28 2008   • PACEMAKER IMPLANTATION         Family History   Problem Relation Age of Onset   • Heart failure Mother    • Cancer Mother    • Hypertension Mother         Chf   • Diabetes Mother    • Cancer Father         LUNG   • Stroke Other    • Depression Sister         Social History     Socioeconomic History   • Marital status:    Tobacco Use   • Smoking status: Every Day     Packs/day: 0.50     Years: 43.00     Pack years: 21.50     Types: Cigarettes   • Smokeless tobacco: Never   Vaping Use   • Vaping Use: Never used   Substance and Sexual Activity   • Alcohol use: Never   • Drug use: No   • Sexual activity: Defer     Birth control/protection: Surgical, Hysterectomy       Review of Systems   Constitutional: Positive for fatigue.   HENT: Negative.    Respiratory: Negative.    Cardiovascular: Negative.    Endocrine: Negative.    Genitourinary: Negative.    Musculoskeletal: Negative.    Skin: Negative.    Allergic/Immunologic: Negative.    Neurological: Negative.    Hematological: Negative.    Psychiatric/Behavioral: Negative.        Cardiovascular Procedures    ECHO/MUGA:  STRESS TESTS:   CARDIAC CATH:   DEVICES:   HOLTER:   CT/MRI:   VASCULAR:   CARDIOTHORACIC:     Objective  Vitals:    05/11/23 1411   BP: 132/62   BP Location: Right arm   Patient Position: Lying   Cuff Size: Adult   Pulse: 70   Resp: 19   Temp: 98 °F (36.7 °C)   TempSrc: Infrared   SpO2: 98%   Weight: 88 kg (194 lb)   Height: 157.5 cm (62\")   PainSc: 0-No pain     Body mass index is 35.48 kg/m².     Physical Exam  Vitals reviewed.   Constitutional:       Appearance: Healthy appearance. Not in distress.   Neck:      Vascular: No JVR. JVD normal.   Pulmonary:      Effort: Pulmonary effort is normal.      Breath sounds: Normal breath sounds. No wheezing. No rhonchi. No rales.   Chest:      Chest wall: Not tender to palpatation.   Cardiovascular:      PMI at " left midclavicular line. Normal rate. Regular rhythm. Normal S1. Normal S2.      Murmurs: There is no murmur.      No gallop. No click. No rub.   Pulses:     Intact distal pulses.   Edema:     Peripheral edema absent.   Abdominal:      General: Bowel sounds are normal.      Palpations: Abdomen is soft.      Tenderness: There is no abdominal tenderness.   Musculoskeletal: Normal range of motion.         General: No tenderness. Skin:     General: Skin is warm and dry.   Neurological:      General: No focal deficit present.      Mental Status: Alert and oriented to person, place and time.          Diagnostic Data    ECG 12 Lead    Date/Time: 5/11/2023 3:29 PM  Performed by: Capo Lock MD  Authorized by: Capo Lock MD   Comparison: compared with previous ECG from 3/13/2023  Similar to previous ECG  Rhythm: paced  Rate: normal  BPM: 72  QRS axis: normal    Clinical impression: abnormal EKG            Assessment and Plan  Diagnoses and all orders for this visit:    Chronic diastolic congestive heart failure- On lasix 20 mg, no edema    Paroxysmal atrial fibrillation- she seems doing well on Flecainide 50 mg bid and apparently Xarelto,    Dyslipidemia- on Atorvastatin 10 mg,    Presence of cardiac pacemaker- recently change and new LV wire, feels like needs to be adjust, she feels tired and fatigue    Tobacco abuse- Still smoking          No follow-ups on file.    Capo Lock MD  05/11/2023

## 2023-05-11 NOTE — TELEPHONE ENCOUNTER
Received a message from Crystal in Dr Lock's office wanting to get Mrs Singh in for a f/u appt in regards to extreme fatigue and device check.  Called and left message for a return call to schedule appt with PA for next week.

## 2023-05-17 ENCOUNTER — OFFICE VISIT (OUTPATIENT)
Dept: CARDIOLOGY | Facility: CLINIC | Age: 68
End: 2023-05-17
Payer: MEDICARE

## 2023-05-17 VITALS
HEART RATE: 76 BPM | DIASTOLIC BLOOD PRESSURE: 70 MMHG | OXYGEN SATURATION: 98 % | SYSTOLIC BLOOD PRESSURE: 130 MMHG | WEIGHT: 193 LBS | HEIGHT: 62 IN | BODY MASS INDEX: 35.51 KG/M2

## 2023-05-17 DIAGNOSIS — I50.32 CHRONIC DIASTOLIC CONGESTIVE HEART FAILURE: Primary | ICD-10-CM

## 2023-05-17 DIAGNOSIS — I47.1 ATRIAL TACHYCARDIA: ICD-10-CM

## 2023-05-17 DIAGNOSIS — I44.2 COMPLETE HEART BLOCK: ICD-10-CM

## 2023-05-17 DIAGNOSIS — Z95.0 PRESENCE OF CARDIAC PACEMAKER: ICD-10-CM

## 2023-05-17 NOTE — PROGRESS NOTES
"    Subjective:           Patient ID: Мария Singh is a 67 y.o.  white female from [place], [place].    HCP: TAYLOR Veliz    Chief Complaint:   Chief Complaint   Patient presents with   • Chronic diastolic congestive heart failure     Problem List:  1. Paroxysmal atrial fibrillation  a. CHADsVasc 3,  b. GI complaints with Eliquis, switched to Xarelto  c. Pacemaker implantation 2008  d. Status post AV node ablation  e. [employer] biventricular pacemaker generator change July 2020  f. On flecainide  2. Complete heart block  3. Hypertension  4. Hyperlipidemia  5. Type 2 diabetes mellitus; hemoglobin A1c 7.6% February 2023  6. Chronic diastolic heart failure  a. Echocardiogram 7/10/2017: EF 70-75% with normal valvular morphology  7. Anemia  8. Tobacco use; 1 pack/day x 43 years  9. Surgical history:  a. Cholecystectomy  b. Hysterectomy  c. Pacemaker implant  d. AV node ablation    Allergies   Allergen Reactions   • Cephalexin Rash   • Influenza Virus Vaccine Swelling   • Metformin Diarrhea and Swelling   • Sulfa Antibiotics Rash   • Trimethoprim Rash       Current Outpatient Medications   Medication Instructions   • ALPRAZolam (XANAX) 1 mg, Oral, 3 Times Daily   • aspirin 325 mg, Oral, Daily   • atorvastatin (LIPITOR) 10 mg, Oral, Daily   • Bydureon BCise 2 mg, Weekly   • estradiol (ESTRACE) 2 MG tablet Oral, Daily   • flecainide (TAMBOCOR) 50 mg, Oral, 2 Times Daily   • furosemide (LASIX) 20 mg, Oral, Daily   • glucose blood (ONE TOUCH ULTRA TEST) test strip Check blood sugar X 3/day   • glucose blood test strip 3 Times Daily   • Insulin Pen Needle 32G X 4 MM misc Does not apply   • Insulin Syringe-Needle U-100 30G X 5/16\" 0.5 ML misc Uses daily   • Lancets (ONETOUCH ULTRASOFT) lancets Does not apply   • Lancets (ONETOUCH ULTRASOFT) lancets Uses X 3 per day   • oxyCODONE-acetaminophen (PERCOCET) 7.5-325 MG per tablet Every 6 Hours   • potassium chloride 10 MEQ CR tablet 10 mEq, Oral, Daily   • " "rivaroxaban (XARELTO) 20 mg, Oral, Daily   • sertraline (ZOLOFT) 100 mg, 2 Times Daily         HISTORY OF PRESENT ILLNESS:  Pleasant 67-year-old female returns today to our office at the request of her primary cardiologist regarding recent fatigue after a new LV lead placement and generator change on March 30, 2023.  This was a routine generator change but she had a new LV lead placed due to the previous LV lead high threshold and not ideal placement.  She is reports that ever since then she has been very tired fatigued weak and cannot hardly do anything.  She is not having any chest pain.  She is not having any heart failure symptoms.  She is not having palpitations dizziness near syncope.  She recently had lab work with her PCP she tells me that everything was \"perfect\".    ROS   All other systems reviewed and otherwise negative.    Procedures       Objective:       There were no vitals filed for this visit.  There is no height or weight on file to calculate BMI.    Constitutional:       Appearance: Healthy appearance. Not in distress.   Neck:      Vascular: No JVR. JVD normal.   Pulmonary:      Effort: Pulmonary effort is normal.      Breath sounds: Normal breath sounds. No wheezing. No rhonchi. No rales.   Chest:      Chest wall: Not tender to palpatation.   Cardiovascular:      PMI at left midclavicular line. Normal rate. Regular rhythm. Normal S1. Normal S2.      Murmurs: There is no murmur.      No gallop. No click. No rub.   Pulses:     Intact distal pulses.   Edema:     Peripheral edema absent.   Abdominal:      General: Bowel sounds are normal.      Palpations: Abdomen is soft.      Tenderness: There is no abdominal tenderness.   Musculoskeletal: Normal range of motion.         General: No tenderness. Skin:     General: Skin is warm and dry.   Neurological:      General: No focal deficit present.      Mental Status: Alert and oriented to person, place and time.           Lab Review:   Lab Results "   Component Value Date    GLUCOSE 170 (H) 03/30/2023    BUN 17 03/30/2023    CREATININE 0.77 03/30/2023    BCR 22.1 03/30/2023    CO2 24.0 03/30/2023    CALCIUM 8.9 03/30/2023    PROTENTOTREF 6.7 08/09/2022    ALBUMIN 3.8 08/09/2022    LABIL2 1.3 08/09/2022    AST 22 08/09/2022    ALT 16 08/09/2022       Lab Results   Component Value Date    WBC 6.00 03/30/2023    HGB 11.1 (L) 03/30/2023    HCT 34.3 03/30/2023    MCV 94.2 03/30/2023     03/30/2023       Lab Results   Component Value Date    HGBA1C 7.6 (H) 02/14/2023       Lab Results   Component Value Date    TRIG 153 (H) 09/08/2020    TRIG 162 (H) 11/07/2019     Lab Results   Component Value Date    HDL 44 (L) 09/08/2020    HDL 39 (L) 11/07/2019     Lab Results   Component Value Date    LDL 54 09/08/2020    LDL 44 11/07/2019     Advance Care Planning   ACP discussion was declined by the patient. Patient does not have an advance directive, declines further assistance.     Device interrogation: [employer] BiV pacemaker.  DDD at 60.  2% a paced.  CRT therapy 100%.  P wave 1.5 mV.  Patient is dependent.  Threshold LV lead now 1.5 V.  Acceptable impedances.  Changed LV vector from 3 to Can to 4 to RV.        Assessment:     O     Diagnosis Plan   1. Complete heart block (HCC)   pacemaker dependent.    Resynchronization pacemaker in situ.    [employer] generator change with new LV lead placement March 30, 2023.  Interrogation today reveals stable thresholds.  We will change LV vector to try to improve patient's symptoms of fatigue and CRT therapy.  In addition, would like her to have a follow-up echocardiogram which she has not had in 5 years.  .        2. Chronic diastolic congestive heart failure (HCC)   stable.  Euvolemic.  Doing well.      3. Paroxysmal atrial fibrillation (HCC)   she has not had any A-fib by interrogation today.  She remains on flecainide.  EKG is stable normal QRS.  She declines anticoagulation therapy.  She also declines  Watchman device.  She understands that she is at a higher risk of stroke without anticoagulation or a Watchman.  She is agreeable to take aspirin daily.  .          4. Tobacco abuse  Ongoing Tobacco Abuse Counseling:    This patient currently uses tobacco products.  We discussed that tobacco abuse is an addiction and is strongly linked with poor health outcomes such as heart disease, stroke, vascular disease, cancer and premature death.  I strongly advised the patient that immediate cessation of tobacco is critical to preserving, maintaining and improving their health.  We discussed various methods of tobacco abuse cessation, including counseling and various pharmacologic and non-pharmacologic therapies. We spent over 10 minutes discussing all options pertinent to tobacco cessation.  The patient voiced a clear understanding of these risks and these medical facts regarding tobacco abuse.          Return for follow-up in 3 months or sooner as needed.  Electronically signed by STEPHEN Oliver, 05/17/23, 3:55 PM EDT.

## 2023-07-30 PROCEDURE — 93296 REM INTERROG EVL PM/IDS: CPT | Performed by: INTERNAL MEDICINE

## 2023-07-30 PROCEDURE — 93294 REM INTERROG EVL PM/LDLS PM: CPT | Performed by: INTERNAL MEDICINE

## 2023-08-23 ENCOUNTER — TELEPHONE (OUTPATIENT)
Dept: CARDIOLOGY | Facility: CLINIC | Age: 68
End: 2023-08-23
Payer: MEDICARE

## 2023-08-23 NOTE — TELEPHONE ENCOUNTER
Spoke with Mrs. Singh and she is going to come tomorrow at 11:10 for device check with [employer].

## 2023-08-23 NOTE — TELEPHONE ENCOUNTER
Caller: Мария Singh    Relationship to patient: Self    Best call back number: [phone]    Chief complaint: PT IS IN NEED OF A PACEMAKER CHECK. SHE HAD A NEW ONE PLACED A FEW MONTHS AGO AND WANTS TO GET A ROUTINE CHECK GOING FOR IT.     Type of visit: PACER CHECK    Requested date: ASAP     Additional notes: DR. STOVER WAS THE ONE THAT PLACED THE PACEMAKER IN PT. PACER IS [employer].

## 2023-08-24 ENCOUNTER — CLINICAL SUPPORT NO REQUIREMENTS (OUTPATIENT)
Dept: CARDIOLOGY | Facility: CLINIC | Age: 68
End: 2023-08-24
Payer: MEDICARE

## 2023-08-24 DIAGNOSIS — Z95.0 CARDIAC PACEMAKER IN SITU: Primary | ICD-10-CM

## 2023-08-24 PROCEDURE — 93281 PM DEVICE PROGR EVAL MULTI: CPT | Performed by: INTERNAL MEDICINE

## 2023-09-12 ENCOUNTER — TELEPHONE (OUTPATIENT)
Dept: CARDIOLOGY | Facility: CLINIC | Age: 68
End: 2023-09-12
Payer: MEDICARE

## 2023-09-12 DIAGNOSIS — I48.4 ATYPICAL ATRIAL FLUTTER: ICD-10-CM

## 2023-09-12 RX ORDER — FLECAINIDE ACETATE 50 MG/1
50 TABLET ORAL 2 TIMES DAILY
Qty: 60 TABLET | Refills: 0 | Status: SHIPPED | OUTPATIENT
Start: 2023-09-12 | End: 2023-09-13 | Stop reason: SDUPTHER

## 2023-09-12 NOTE — TELEPHONE ENCOUNTER
Pt was added to schedule . Spoke with pt and she is going to pick  up  medication she has one left

## 2023-09-12 NOTE — TELEPHONE ENCOUNTER
PT NEEDS A REFILL ON flecainide (TAMBOCOR) 50 MG tablet [[id number]] (Order [id number]), [facility] 9/12/23

## 2023-09-13 DIAGNOSIS — I48.4 ATYPICAL ATRIAL FLUTTER: ICD-10-CM

## 2023-09-13 RX ORDER — FLECAINIDE ACETATE 50 MG/1
50 TABLET ORAL 2 TIMES DAILY
Qty: 60 TABLET | Refills: 0 | Status: SHIPPED | OUTPATIENT
Start: 2023-09-13

## 2023-10-02 ENCOUNTER — OFFICE VISIT (OUTPATIENT)
Dept: CARDIOLOGY | Facility: CLINIC | Age: 68
End: 2023-10-02
Payer: MEDICARE

## 2023-10-02 VITALS
HEART RATE: 82 BPM | DIASTOLIC BLOOD PRESSURE: 84 MMHG | SYSTOLIC BLOOD PRESSURE: 136 MMHG | BODY MASS INDEX: 36.4 KG/M2 | OXYGEN SATURATION: 96 % | HEIGHT: 62 IN | WEIGHT: 197.8 LBS

## 2023-10-02 DIAGNOSIS — I44.2 COMPLETE HEART BLOCK: ICD-10-CM

## 2023-10-02 DIAGNOSIS — I48.0 PAROXYSMAL ATRIAL FIBRILLATION: Primary | ICD-10-CM

## 2023-10-02 DIAGNOSIS — Z95.0 PRESENCE OF CARDIAC PACEMAKER: ICD-10-CM

## 2023-10-02 PROBLEM — Z79.899 LONG TERM CURRENT USE OF ANTIARRHYTHMIC DRUG: Status: ACTIVE | Noted: 2023-10-02

## 2023-10-02 PROCEDURE — 93281 PM DEVICE PROGR EVAL MULTI: CPT | Performed by: PHYSICIAN ASSISTANT

## 2023-10-02 PROCEDURE — 1159F MED LIST DOCD IN RCRD: CPT | Performed by: PHYSICIAN ASSISTANT

## 2023-10-02 PROCEDURE — 99213 OFFICE O/P EST LOW 20 MIN: CPT | Performed by: PHYSICIAN ASSISTANT

## 2023-10-02 PROCEDURE — 1160F RVW MEDS BY RX/DR IN RCRD: CPT | Performed by: PHYSICIAN ASSISTANT

## 2023-10-02 NOTE — PROGRESS NOTES
Encounter Date:10/02/2023      Patient ID: Мария Singh is a 68 y.o. female.    Montserrat Flores, TAYLOR    Chief Complaint: Paroxysmal atrial fibrillation and Pacemaker Check      PROBLEM LIST:  Patient Active Problem List    Diagnosis Date Noted    Complete heart block 10/03/2022     Priority: High     Note Last Updated: 8/23/2023     [employer] BiV permanent pacemaker, last generator change 7-20-20  Upgrade to BiV permanent pacemaker, 3/30/2023:[employer] pulse generator with serial number [employer] model [id number] with serial #[id number].       Atrial fibrillation 08/12/2016     Priority: High     Note Last Updated: 10/2/2023     CHADsVasc 5  GI complaints with Eliquis, Xarelto  Pacemaker implantation 2008  Status post AV node ablation  [employer] biventricular pacemaker generator change July 2020  On flecainide      Chronic diastolic congestive heart failure 08/09/2022     Priority: Medium     Note Last Updated: 10/3/2022     Echocardiogram 7/10/2017: EF 70-75% with normal valvular morphology      Presence of cardiac pacemaker 06/13/2016     Priority: Medium    Long term current use of antiarrhythmic drug 10/02/2023     Priority: Low    Tobacco abuse 06/13/2016     Priority: Low    Dyslipidemia 06/13/2016     Priority: Low    Class 2 obesity in adult 10/03/2022    Iron deficiency anemia 10/03/2022    Uncontrolled type 2 diabetes mellitus 06/13/2016               History of Present Illness  Patient presents today for follow-up with a history of persistent atrial fibrillation status post AV node ablation and permanent pacemaker implantation.  More recently, she underwent upgrade to a BiV permanent pacemaker earlier this year.  She returns today for scheduled follow-up.  She has no awareness of palpitations, no dizziness no syncope.  She does not check her blood pressure at home but states that  Appointments it is typically in the 120s.  She is complaining of fatigue and excess  "sleepiness.  She attributes the symptoms to iron deficiency anemia and has an upcoming appointment with hematology.  I noticed she is not on anticoagulation.  States that she has not been on full anticoagulation \"for a long time\".  She has been previously counseled about the risk of stroke and wished to stay on aspirin 325 mg daily.  She was not able to tolerate either Eliquis or Xarelto due to GI upset.  She has been previously advised to have a left atrial appendage occlusion but declined.    Allergies   Allergen Reactions    Cephalexin Rash    Influenza Virus Vaccine Swelling    Metformin Diarrhea and Swelling    Sulfa Antibiotics Rash    Trimethoprim Rash       Current Outpatient Medications   Medication Instructions    ALPRAZolam (XANAX) 1 mg, Oral, 3 Times Daily    aspirin 325 mg, Oral, Daily    atorvastatin (LIPITOR) 10 mg, Oral, Daily    Bydureon BCise 2 mg, Weekly    estradiol (ESTRACE) 2 MG tablet Oral, Daily    flecainide (TAMBOCOR) 50 mg, Oral, 2 Times Daily    furosemide (LASIX) 20 mg, Oral, Daily    glucose blood (ONE TOUCH ULTRA TEST) test strip Check blood sugar X 3/day    glucose blood test strip 3 Times Daily    Insulin Pen Needle 32G X 4 MM misc Does not apply    Insulin Syringe-Needle U-100 30G X 5/16\" 0.5 ML misc Uses daily    Lancets (ONETOUCH ULTRASOFT) lancets Does not apply    Lancets (ONETOUCH ULTRASOFT) lancets Uses X 3 per day    oxyCODONE-acetaminophen (PERCOCET) 7.5-325 MG per tablet Every 6 Hours    potassium chloride 10 MEQ CR tablet 10 mEq, Oral, Daily    sertraline (ZOLOFT) 100 mg, 2 Times Daily       .    Objective:     /84 (BP Location: Right arm, Patient Position: Sitting)   Pulse 82   Ht 157.5 cm (62\")   Wt 89.7 kg (197 lb 12.8 oz)   SpO2 96%   BMI 36.18 kg/m²    Body mass index is 36.18 kg/m².     Constitutional:       Appearance: Well-developed.   Pulmonary:      Effort: Pulmonary effort is normal. No respiratory distress.      Breath sounds: Normal breath sounds. " No wheezing. No rales.      Comments: Bases clear  Chest:      Chest wall: Not tender to palpatation.   Cardiovascular:      Normal rate. Regular rhythm.      Murmurs: There is no murmur.      No gallop.  No click. No rub.   Pulses:     Intact distal pulses.   Edema:     Peripheral edema absent.   Musculoskeletal: Normal range of motion.     Lab Review:                   CHADS-VASc Risk Assessment              5 Total Score    1 CHF    1 DM    1 Age 65-74    1 Sex: Female                  1        Criteria that do not apply:    Hypertension    Age >/= 75    PRIOR STROKE/TIA/THROMBO    Vascular Disease                  Procedures               Assessment:      Diagnosis Plan   1. Paroxysmal atrial fibrillation  Status post AV node ablation for persistent atrial fibrillation.  I have upgraded her [id number] to 5.  We have had a lengthy and detailed discussion about risk and benefits of left atrial appendage occlusion.  She seems more intrigued and less fearful after I reviewed the procedure.  She and her  will think about it more.  I have advised her that she can call us at any time to be put on the schedule.  She has been thoroughly counseled on the risks of embolic stroke.      2. Complete heart block  Normal functioning biventricular pacemaker with 3% right atrial pacing and 100% RV/LV pacing.  Normal lead parameters.  9 years battery life.  No events.  Underlying rhythm complete heart block      3. Presence of cardiac pacemaker    This patient's Cardiac Implanted Electronic Device was manually interrogated and reprogrammed during the patient encounter today.  Iterative programming changes were manually made to determine the sensing threshold, pacing threshold, lead impedance as well as underlying cardiac rhythm.  These programming changes were not limited to but included some or all of the following when appropriate: pacing mode, programmed AV delays, blanking periods, and refractory periods.  Data  obtained as a result of these manual programing changes informed the patient's CIED permanent programming.          Plan:     Stable cardiac status.  Continue current medications.   in 6 months, sooner as needed.  Thank you for allowing us to participate in the care of your patient.     Electronically signed by STEPHEN Kidd, 10/02/23, 4:21 PM EDT.

## 2024-03-27 ENCOUNTER — TELEPHONE (OUTPATIENT)
Dept: CARDIOLOGY | Facility: CLINIC | Age: 69
End: 2024-03-27
Payer: MEDICARE

## 2024-03-27 NOTE — TELEPHONE ENCOUNTER
Caller: Мария Singh    Relationship to patient: Self    Best call back number: [phone]    Chief complaint: PACEMAKER CHECK, [employer]     Type of visit: PACER CHECK    Requested date: SHE IS COMING UP ON 6 MONTHS WITHOUT A CHECK     SHE WOULD LIKE TO SEE IF SHE CAN HAVE THIS DONE IN [place] WITH DR. ROUSSEAU'S OFFICE.

## 2024-03-27 NOTE — TELEPHONE ENCOUNTER
Attempted to call patient back and patient did not answer.     Patient has appt with  on 4/8/24 and will get a pacemaker check at that appointment. She also has remote monitoring of her pacemaker at home.

## 2024-04-08 ENCOUNTER — OFFICE VISIT (OUTPATIENT)
Dept: CARDIOLOGY | Facility: CLINIC | Age: 69
End: 2024-04-08
Payer: MEDICARE

## 2024-04-08 VITALS
HEART RATE: 80 BPM | DIASTOLIC BLOOD PRESSURE: 70 MMHG | BODY MASS INDEX: 36.18 KG/M2 | WEIGHT: 196.6 LBS | OXYGEN SATURATION: 98 % | SYSTOLIC BLOOD PRESSURE: 122 MMHG | HEIGHT: 62 IN

## 2024-04-08 DIAGNOSIS — I48.4 ATYPICAL ATRIAL FLUTTER: ICD-10-CM

## 2024-04-08 DIAGNOSIS — I48.0 PAROXYSMAL ATRIAL FIBRILLATION: ICD-10-CM

## 2024-04-08 DIAGNOSIS — I44.2 COMPLETE HEART BLOCK: Primary | ICD-10-CM

## 2024-04-08 DIAGNOSIS — Z79.899 LONG TERM CURRENT USE OF ANTIARRHYTHMIC DRUG: ICD-10-CM

## 2024-04-08 DIAGNOSIS — Z95.0 PRESENCE OF CARDIAC PACEMAKER: ICD-10-CM

## 2024-04-08 PROCEDURE — 93281 PM DEVICE PROGR EVAL MULTI: CPT | Performed by: PHYSICIAN ASSISTANT

## 2024-04-08 PROCEDURE — G2211 COMPLEX E/M VISIT ADD ON: HCPCS | Performed by: PHYSICIAN ASSISTANT

## 2024-04-08 PROCEDURE — 99213 OFFICE O/P EST LOW 20 MIN: CPT | Performed by: PHYSICIAN ASSISTANT

## 2024-04-08 PROCEDURE — 1159F MED LIST DOCD IN RCRD: CPT | Performed by: PHYSICIAN ASSISTANT

## 2024-04-08 PROCEDURE — 1160F RVW MEDS BY RX/DR IN RCRD: CPT | Performed by: PHYSICIAN ASSISTANT

## 2024-04-08 RX ORDER — DABIGATRAN ETEXILATE 150 MG/1
150 CAPSULE ORAL 2 TIMES DAILY
Qty: 60 CAPSULE | Refills: 5 | Status: SHIPPED | OUTPATIENT
Start: 2024-04-08

## 2024-04-08 RX ORDER — FLECAINIDE ACETATE 50 MG/1
50 TABLET ORAL 2 TIMES DAILY
Qty: 60 TABLET | Refills: 5 | Status: SHIPPED | OUTPATIENT
Start: 2024-04-08

## 2024-04-08 NOTE — PROGRESS NOTES
Encounter Date:04/08/2024      Patient ID: Мария Singh is a 68 y.o. female.    Montserrat Flores APRN    [place] Complaint EP: Atrial Fibrillation, Heart block, and Pacemaker check    PROBLEM LIST:  Patient Active Problem List    Diagnosis Date Noted    Complete heart block 10/03/2022     Priority: High     Note Last Updated: 4/8/2024     [employer] BiV permanent pacemaker, last generator change 7-20-20  CRT pacemaker generator change, 3/30/2023: With implantation of a new LV lead and abandonment of previous LV lead. [employer] pulse generator with serial number [employer] model [id number] with serial #[id number].      Atrial fibrillation 08/12/2016     Priority: High     Note Last Updated: 10/2/2023     CHADsVasc 5  GI complaints with Eliquis, Xarelto  Pacemaker implantation 2008  Status post AV node ablation  [employer] biventricular pacemaker generator change July 2020  On flecainide      Chronic diastolic congestive heart failure 08/09/2022     Priority: Medium     Note Last Updated: 10/3/2022     Echocardiogram 7/10/2017: EF 70-75% with normal valvular morphology      Presence of cardiac pacemaker 06/13/2016     Priority: Medium    Long term current use of antiarrhythmic drug 10/02/2023     Priority: Low    Tobacco abuse 06/13/2016     Priority: Low    Dyslipidemia 06/13/2016     Priority: Low    Class 2 obesity in adult 10/03/2022    Iron deficiency anemia 10/03/2022    Uncontrolled type 2 diabetes mellitus 06/13/2016               History of Present Illness  Patient presents today for follow-up with a history of atrial fibrillation status post AV node ablation with a resynchronization permanent pacemaker.  She returns today for scheduled electrophysiology follow-up.  When I saw her last she was not on anticoagulation due to GI intolerance to both Eliquis and Xarelto.  We had a detailed discussion about a left atrial appendage occlusion.  She returns today for scheduled EP follow-up.   "She remains on coagulated.  She declines left atrial appendage occlusion.  She has had no awareness of palpitations, no dizziness no syncope.  Her blood pressure at home typically runs in the 120s to 130s.    Allergies   Allergen Reactions    Cephalexin Rash    Influenza Virus Vaccine Swelling    Metformin Diarrhea and Swelling    Sulfa Antibiotics Rash    Trimethoprim Rash       Current Outpatient Medications   Medication Instructions    ALPRAZolam (XANAX) 1 mg, Oral, 3 Times Daily    aspirin 325 mg, Oral, Daily    atorvastatin (LIPITOR) 10 mg, Oral, Daily    Bydureon BCise 2 mg, Weekly    estradiol (ESTRACE) 2 MG tablet Oral, Daily    flecainide (TAMBOCOR) 50 mg, Oral, 2 Times Daily    furosemide (LASIX) 20 mg, Oral, Daily    glucose blood (ONE TOUCH ULTRA TEST) test strip Check blood sugar X 3/day    glucose blood test strip 3 Times Daily    Insulin Pen Needle 32G X 4 MM misc Does not apply    Insulin Syringe-Needle U-100 30G X 5/16\" 0.5 ML misc Uses daily    Lancets (ONETOUCH ULTRASOFT) lancets Does not apply    Lancets (ONETOUCH ULTRASOFT) lancets Uses X 3 per day    oxyCODONE-acetaminophen (PERCOCET) 7.5-325 MG per tablet Every 6 Hours    potassium chloride 10 MEQ CR tablet 10 mEq, Oral, Daily    sertraline (ZOLOFT) 100 mg, 2 Times Daily       .    Objective:     /70   Pulse 80   Ht 157.5 cm (62\")   Wt 89.2 kg (196 lb 9.6 oz)   SpO2 98%   BMI 35.96 kg/m²    Body mass index is 35.96 kg/m².     Constitutional:       Appearance: Well-developed.   Pulmonary:      Effort: Pulmonary effort is normal. No respiratory distress.      Breath sounds: Normal breath sounds. No wheezing. No rales.      Comments: Bases clear  Chest:      Chest wall: Not tender to palpatation.   Cardiovascular:      Normal rate. Regular rhythm.      Murmurs: There is no murmur.      No gallop.  No click. No rub.   Pulses:     Intact distal pulses.   Edema:     Peripheral edema absent.   Musculoskeletal: Normal range of motion. "       Lab Review:     Lab Results   Component Value Date    GLUCOSE 170 (H) 03/30/2023    BUN 17 03/30/2023    CREATININE 0.77 03/30/2023    EGFRRESULT 61 08/09/2022    EGFR 84.7 03/30/2023    BCR 22.1 03/30/2023    K 4.2 03/30/2023    CO2 24.0 03/30/2023    CALCIUM 8.9 03/30/2023    PROTENTOTREF 6.7 08/09/2022    ALBUMIN 3.8 02/14/2023    BILITOT 0.3 02/14/2023    AST 27 02/14/2023    ALT 20 02/14/2023     Lab Results   Component Value Date    WBC 6.04 05/15/2023    HGB 10.4 (L) 05/15/2023    HCT 33.2 (L) 05/15/2023    MCV 91.7 05/15/2023     05/15/2023       Procedures               Assessment:      Diagnosis Plan   1. Complete heart block  Normal functioning resynchronization permanent pacemaker with 1% right atrial pacing and 100% LV/RV pacing.  Normal lead parameters and 7.5 years battery life      2. Presence of cardiac pacemaker    This patient's Cardiac Implanted Electronic Device was manually interrogated and reprogrammed during the patient encounter today.  Iterative programming changes were manually made to determine the sensing threshold, pacing threshold, lead impedance as well as underlying cardiac rhythm.  These programming changes were not limited to but included some or all of the following when appropriate: pacing mode, programmed AV delays, blanking periods, and refractory periods.  Data obtained as a result of these manual programing changes informed the patient's CIED permanent programming.        3. Paroxysmal atrial fibrillation  No events noted on device interrogation.  [id number], high stroke risk.  This was once again emphasized.  She is agreeable to a trial of Pradaxa 150 mg twice daily.  I have asked her to stop taking aspirin while on Pradaxa.  She will not consider warfarin.      4. Long term current use of antiarrhythmic drug  Stable EKG.  Refilled flecainide at current dose        Plan:     Advance Care Planning   ACP discussion was held with the patient during this visit.  Patient has an advance directive (not in EMR), copy requested.           Stable cardiac status.  Continue current medications.   in 6 months, sooner as needed.  Thank you for allowing us to participate in the care of your patient.     Electronically signed by STEPHEN Kidd, 04/08/24, 3:53 PM EDT.

## 2024-10-09 RX ORDER — DABIGATRAN ETEXILATE 150 MG/1
150 CAPSULE ORAL 2 TIMES DAILY
Qty: 180 CAPSULE | Refills: 1 | Status: SHIPPED | OUTPATIENT
Start: 2024-10-09

## 2024-10-14 ENCOUNTER — OFFICE VISIT (OUTPATIENT)
Dept: CARDIOLOGY | Facility: CLINIC | Age: 69
End: 2024-10-14
Payer: MEDICARE

## 2024-10-14 VITALS
DIASTOLIC BLOOD PRESSURE: 70 MMHG | SYSTOLIC BLOOD PRESSURE: 138 MMHG | HEART RATE: 76 BPM | WEIGHT: 198 LBS | HEIGHT: 62 IN | OXYGEN SATURATION: 100 % | BODY MASS INDEX: 36.44 KG/M2

## 2024-10-14 DIAGNOSIS — I48.0 PAROXYSMAL ATRIAL FIBRILLATION: Primary | ICD-10-CM

## 2024-10-14 PROCEDURE — 93000 ELECTROCARDIOGRAM COMPLETE: CPT | Performed by: PHYSICIAN ASSISTANT

## 2024-10-14 PROCEDURE — 99214 OFFICE O/P EST MOD 30 MIN: CPT | Performed by: PHYSICIAN ASSISTANT

## 2024-10-14 PROCEDURE — 93281 PM DEVICE PROGR EVAL MULTI: CPT | Performed by: PHYSICIAN ASSISTANT

## 2024-10-14 NOTE — PROGRESS NOTES
[facility]   OFFICE NOTE      Мария Singh  1955  PCP: Montserrat Flores APRN    SUBJECTIVE:   Мария Singh is a 69 y.o. female seen for a follow up visit regarding the following:     CC:Afib.     HPI:   69 year old presents for follow up of Afib, CHB, BSC BIVPPM, CHF and HTN. Since we last saw the pt, pt denies any AF episodes, worsening SOB, CP, LH, and dizziness. Denies any hospitalizations, ER visits, bleeding, or TIA/CVA symptoms. She has a lot of OA in Knees that is limiting her activities.     Cardiac PMH: (Old records have been reviewed and summarized below)  Problem List:  Paroxysmal atrial fibrillation  CHADsVasc 3,  GI complaints with Eliquis, switched to Xarelto-now on Pradaxa  Pacemaker implantation 2008  Status post AV node ablation  [employer] biventricular pacemaker generator change July 2020  On flecainide  Complete heart block  Hypertension  Hyperlipidemia  Type 2 diabetes mellitus; hemoglobin A1c 7.6% February 2023  Chronic diastolic heart failure  Echocardiogram 7/10/2017: EF 70-75% with normal valvular morphology  Echocardiogram 6/2/2023, EF 65%, CECILY 3.1cm, Diastolic dysfunction. Increased RVSP 48mmHG.    Anemia  Tobacco use; 1 pack/day x 43 years/COPD symptoms   Surgical history:  Cholecystectomy  Hysterectomy  Pacemaker implant  AV node ablation    Past Medical History, Past Surgical History, Family history, Social History, and Medications were all reviewed with the patient today and updated as necessary.       Current Outpatient Medications:     ALPRAZolam (XANAX) 1 MG tablet, Take 1 tablet by mouth 3 (Three) Times a Day., Disp: , Rfl:     atorvastatin (LIPITOR) 10 MG tablet, Take 1 tablet by mouth Daily., Disp: , Rfl:     Bydureon BCise 2 MG/0.85ML auto-injector injection, 0.85 mL 1 (One) Time Per Week., Disp: , Rfl:     dabigatran etexilate (PRADAXA) 150 MG capsu, TAKE 1 CAPSULE BY MOUTH 2 TIMES A DAY, Disp: 180 capsule, Rfl: 1     "estradiol (ESTRACE) 2 MG tablet, Take  by mouth daily., Disp: , Rfl:     flecainide (TAMBOCOR) 50 MG tablet, Take 1 tablet by mouth 2 (Two) Times a Day., Disp: 60 tablet, Rfl: 5    furosemide (LASIX) 20 MG tablet, Take 1 tablet by mouth Daily., Disp: , Rfl:     glucose blood (ONE TOUCH ULTRA TEST) test strip, Check blood sugar X 3/day, Disp: 10 each, Rfl: 5    glucose blood test strip, 3 (three) times a day., Disp: , Rfl:     Insulin Pen Needle 32G X 4 MM misc, , Disp: , Rfl:     Insulin Syringe-Needle U-100 30G X 5/16\" 0.5 ML misc, Uses daily, Disp: 100 each, Rfl: 0    Lancets (ONETOUCH ULTRASOFT) lancets, , Disp: , Rfl:     Lancets (ONETOUCH ULTRASOFT) lancets, Uses X 3 per day, Disp: 100 each, Rfl: 5    oxyCODONE-acetaminophen (PERCOCET) 7.5-325 MG per tablet, Every 6 (Six) Hours., Disp: , Rfl:     potassium chloride 10 MEQ CR tablet, Take 1 tablet by mouth Daily., Disp: , Rfl:     sertraline (ZOLOFT) 100 MG tablet, 1 tablet 2 (Two) Times a Day., Disp: , Rfl:       Allergies   Allergen Reactions    Cephalexin Rash    Influenza Virus Vaccine Swelling    Metformin Diarrhea and Swelling    Sulfa Antibiotics Rash    Trimethoprim Rash         PHYSICAL EXAM:    /70 (BP Location: Left arm, Patient Position: Sitting, Cuff Size: Adult)   Pulse 76   Ht 157.5 cm (62\")   Wt 89.8 kg (198 lb)   SpO2 100%   BMI 36.21 kg/m²        Wt Readings from Last 5 Encounters:   10/14/24 89.8 kg (198 lb)   04/08/24 89.2 kg (196 lb 9.6 oz)   10/02/23 89.7 kg (197 lb 12.8 oz)   06/22/23 87.5 kg (193 lb)   06/02/23 87.5 kg (193 lb)       BP Readings from Last 5 Encounters:   10/14/24 138/70   04/08/24 122/70   10/02/23 136/84   06/22/23 126/74   06/02/23 130/60       General appearance - Alert, well appearing, and in no distress   Mental status - Affect appropriate to mood.  Eyes - Sclerae anicteric,  ENMT - Hearing grossly normal bilaterally, Dental hygiene good.  Neck - Carotids upstroke normal bilaterally, no bruits, no " JVD.  Resp - Bilateral wheezes, Rhonchi   Heart - Normal rate, regular rhythm, normal S1, S2, no murmurs, rubs, clicks or gallops.  GI - Soft, nontender, nondistended, no masses or organomegaly.  Neurological - Grossly intact - normal speech, no focal findings  Musculoskeletal - No joint tenderness, deformity or swelling, no muscular tenderness noted.  Extremities - Peripheral pulses normal, no pedal edema, no clubbing or cyanosis.  Skin - Normal coloration and turgor.  Psych -  oriented to person, place, and time.    Medical problems and test results were reviewed with the patient today.     No results found for this or any previous visit (from the past 4 weeks).      EKG: (EKG has been independently visualized by me and summarized below)    ECG 12 Lead    Date/Time: 10/14/2024 3:15 PM  Performed by: Rodolfo Luna PA    Authorized by: Rodolfo Luna PA  Comparison: compared with previous ECG from 4/8/2024  Similar to previous ECG  Rhythm: sinus rhythm and paced  Rate: normal  Conduction: conduction normal  ST Segments: ST segments normal  QRS axis: normal    Clinical impression: abnormal EKG             ASSESSMENT   1. PAF: Flecanide 50mg BID. Pradaxa 150mg BID.     2. CHB: BSC BIVPPM, Stable interrogation. DDD 60. A paced 2%. BIV paced 100%. Normal thresholds and impedances. Battery voltage 7 years. No Afib.     3. Tobacco abuse: discussed need for cessation    4. HLD: Lipitor.     5. HTN:Lasix, Monitor.         PLAN  Follow up Heridia DHF  Quit smoking   Stable cv course. Follow up one year Dr. Gagnon           10/14/2024  15:11 EDTC  Electronically signed by STEPHEN Oliver, 10/14/24, 3:15 PM EDT.

## 2025-04-14 ENCOUNTER — OFFICE VISIT (OUTPATIENT)
Dept: CARDIOLOGY | Facility: CLINIC | Age: 70
End: 2025-04-14
Payer: MEDICARE

## 2025-04-14 VITALS
HEART RATE: 78 BPM | WEIGHT: 204 LBS | DIASTOLIC BLOOD PRESSURE: 70 MMHG | HEIGHT: 62 IN | BODY MASS INDEX: 37.54 KG/M2 | SYSTOLIC BLOOD PRESSURE: 168 MMHG | OXYGEN SATURATION: 97 %

## 2025-04-14 DIAGNOSIS — I44.2 COMPLETE HEART BLOCK: Primary | ICD-10-CM

## 2025-04-14 DIAGNOSIS — Z95.0 PRESENCE OF CARDIAC PACEMAKER: ICD-10-CM

## 2025-04-14 DIAGNOSIS — I48.0 PAROXYSMAL ATRIAL FIBRILLATION: ICD-10-CM

## 2025-04-14 DIAGNOSIS — Z79.899 LONG TERM CURRENT USE OF ANTIARRHYTHMIC DRUG: ICD-10-CM

## 2025-04-14 PROCEDURE — 93281 PM DEVICE PROGR EVAL MULTI: CPT | Performed by: INTERNAL MEDICINE

## 2025-04-14 PROCEDURE — 99214 OFFICE O/P EST MOD 30 MIN: CPT | Performed by: INTERNAL MEDICINE

## 2025-04-14 NOTE — PROGRESS NOTES
Cardiac Electrophysiology Outpatient Follow Up Note            [facility]    Follow Up Office Visit      Мария Singh  [id number]  04/14/2025  [unfilled]  [unfilled]    Primary Care Physician: Montserrat Flores APRN    Referred By: No ref. provider found    Subjective     Chief Complaint:   Diagnoses and all orders for this visit:    1. Complete heart block (Primary)    2. Paroxysmal atrial fibrillation    3. Presence of cardiac pacemaker    4. Long term current use of antiarrhythmic drug      Chief Complaint   Patient presents with    Complete heart block       History of Present Illness:   Мария Singh is a 69 y.o. female who presents to my electrophysiology clinic for follow up of above.      Past Medical History:   Past Medical History:   Diagnosis Date    Anxiety     Atrial fibrillation     Atrial tachycardia     S/P AV NODE ABLATION AND PPM    Diabetes mellitus     Take 1 shot a week    Hyperlipidemia        Past Surgical History:   Past Surgical History:   Procedure Laterality Date    ABLATION OF DYSRHYTHMIC FOCUS      Didn’t work    CARDIAC ELECTROPHYSIOLOGY PROCEDURE N/A 3/30/2023    Procedure: Patient currently has a *BSC* pacemaker.  She will need an upgrade to a BIV pacemaker (new LV lead) within 2 weeks. DNS meds.;  Surgeon: Hipolito Gagnon DO;  Location: [facility] IN[facility]AS[facility] LOC[facility];  Service: Cardiology;  Laterality: N/A;    CHOLECYSTECTOMY      COLONOSCOPY  09/30/2022    HYSTERECTOMY      Total abdominal with removal of both ovaries    INSERT / REPLACE / REMOVE PACEMAKER  03 28 2008    PACEMAKER IMPLANTATION         Family History:   Family History   Problem Relation Age of Onset    Heart failure Mother     Cancer Mother     Hypertension Mother         Chf    Diabetes Mother     Cancer Father         LUNG    Stroke Other     Depression Sister        Social History:   Social History     Socioeconomic History    Marital status:   "  Tobacco Use    Smoking status: Every Day     Current packs/day: 0.50     Average packs/day: 0.5 packs/day for 43.0 years (21.5 ttl pk-yrs)     Types: Cigarettes     Passive exposure: Current    Smokeless tobacco: Never   Vaping Use    Vaping status: Never Used   Substance and Sexual Activity    Alcohol use: Never    Drug use: No    Sexual activity: Defer     Birth control/protection: Surgical, Hysterectomy       Medications:     Current Outpatient Medications:     ALPRAZolam (XANAX) 1 MG tablet, Take 1 tablet by mouth 3 (Three) Times a Day., Disp: , Rfl:     atorvastatin (LIPITOR) 10 MG tablet, Take 1 tablet by mouth Daily., Disp: , Rfl:     Bydureon BCise 2 MG/0.85ML auto-injector injection, 0.85 mL 1 (One) Time Per Week., Disp: , Rfl:     dabigatran etexilate (PRADAXA) 150 MG capsu, TAKE 1 CAPSULE BY MOUTH 2 TIMES A DAY, Disp: 180 capsule, Rfl: 1    estradiol (ESTRACE) 2 MG tablet, Take  by mouth daily., Disp: , Rfl:     flecainide (TAMBOCOR) 50 MG tablet, Take 1 tablet by mouth 2 (Two) Times a Day., Disp: 60 tablet, Rfl: 5    furosemide (LASIX) 20 MG tablet, Take 2 tablets by mouth 2 (Two) Times a Day., Disp: , Rfl:     glucose blood (ONE TOUCH ULTRA TEST) test strip, Check blood sugar X 3/day, Disp: 10 each, Rfl: 5    glucose blood test strip, 3 (three) times a day., Disp: , Rfl:     Insulin Pen Needle 32G X 4 MM misc, , Disp: , Rfl:     Insulin Syringe-Needle U-100 30G X 5/16\" 0.5 ML misc, Uses daily, Disp: 100 each, Rfl: 0    Lancets (ONETOUCH ULTRASOFT) lancets, , Disp: , Rfl:     Lancets (ONETOUCH ULTRASOFT) lancets, Uses X 3 per day, Disp: 100 each, Rfl: 5    oxyCODONE-acetaminophen (PERCOCET) 7.5-325 MG per tablet, Every 8 (Eight) Hours As Needed., Disp: , Rfl:     potassium chloride 10 MEQ CR tablet, Take 1 tablet by mouth Daily., Disp: , Rfl:     sertraline (ZOLOFT) 100 MG tablet, 1 tablet 2 (Two) Times a Day., Disp: , Rfl:     Allergies:   Allergies   Allergen Reactions    Cephalexin Rash    Influenza " "Virus Vaccine Swelling    Metformin Diarrhea and Swelling    Sulfa Antibiotics Rash    Trimethoprim Rash       Objective   Vital Signs:   Vitals:    04/14/25 1445   BP: 168/70   BP Location: Left arm   Patient Position: Sitting   Pulse: 78   SpO2: 97%   Weight: 92.5 kg (204 lb)   Height: 157.5 cm (62\")       PHYSICAL EXAM  General appearance: Awake, alert, cooperative  Head: Normocephalic, without obvious abnormality, atraumatic  Eyes: Conjunctivae/corneas clear, EOMs intact  Neck: no adenopathy, no carotid bruit, no JVD, and thyroid: not enlarged  Lungs: clear to auscultation bilaterally and no rhonchi or crackles\", ' symmetric  Heart: regular rate and rhythm, S1, S2 normal, no murmur, click, rub or gallop  Abdomen: Soft, non-tender, bowel sounds normal,  no organomegaly  Extremities: extremities normal, atraumatic, no cyanosis or edema  Skin: Skin color, turgor normal, no rashes or lesions  Neurologic: Grossly normal     Lab Results   Component Value Date    GLUCOSE 170 (H) 03/30/2023    CALCIUM 8.9 03/30/2023     03/30/2023    K 4.2 03/30/2023    CO2 24.0 03/30/2023     03/30/2023    BUN 17 03/30/2023    CREATININE 0.77 03/30/2023    EGFRIFAFRI >60 02/14/2023    BCR 22.1 03/30/2023    ANIONGAP 9.0 03/30/2023     Lab Results   Component Value Date    WBC 6.04 05/15/2023    HGB 10.4 (L) 05/15/2023    HCT 33.2 (L) 05/15/2023    MCV 91.7 05/15/2023     05/15/2023     No results found for: \"INR\", \"PROTIME\"  No results found for: \"TSH\", \"O7VGYPO\", \"W9EATYL\", \"THYROIDAB\"    Cardiac Testing:     I personally viewed and interpreted the patient's EKG/Telemetry/lab data    Procedures    Tobacco Cessation: Cessation Counseling Provided   Obstructive Sleep Apnea Screening: Completed    Advance Care Planning   ACP discussion was declined by the patient. Patient does not have an advance directive, declines further assistance.       Assessment & Plan    Diagnoses and all orders for this visit:    1. Complete " heart block (Primary)    2. Paroxysmal atrial fibrillation    3. Presence of cardiac pacemaker    4. Long term current use of antiarrhythmic drug         Diagnosis Plan   1. Complete heart block  Pacemaker dependent.      2. Paroxysmal atrial fibrillation  Scant A-fib.  Anticoagulate for the primary prevention of stroke.  Doing well.      3. Presence of cardiac pacemaker  [employer] resynchronization pacemaker in situ.      4. Long term current use of antiarrhythmic drug  Flecainide.  Doing well.        Body mass index is 37.31 kg/m².    I spent 37 minutes in consultation with this patient which included more than 65% of this time in direct face-to-face counseling, physical examination and discussion of my assessment and findings and this shared decision making with the patient.  The remainder of the time not spent face-to-face was performing one, some or all of the following actions: preparing to see the patient (e.g. reviewing tests, prior clinicians' notes, etc), ordering medications, tests or procedures, coordination of care, discussion of the plan with other healthcare providers, documenting clinical information in epic as well as independently interpreting results and communication of these results to the patient family and/or caregiver(s).  Please note that this explicitly excludes time spent on other separate billable services such as performing procedures or test interpretation, when applicable.      Follow Up:       Thank you for allowing me to participate in the care of your patient. Please to not hesitate to contact me with additional questions or concerns.      Hipolito Gagnon DO, FACC, RS  Cardiac Electrophysiologist  [facility]

## 2025-08-15 LAB
MC_CV_MDC_IDC_RATE_1: 160
MC_CV_MDC_IDC_ZONE_ID: 1
MDC_IDC_MSMT_BATTERY_REMAINING_LONGEVITY: 72 MO
MDC_IDC_MSMT_BATTERY_REMAINING_PERCENTAGE: 82 %
MDC_IDC_MSMT_BATTERY_STATUS: NORMAL
MDC_IDC_MSMT_LEADCHNL_LV_IMPEDANCE_VALUE: 529
MDC_IDC_MSMT_LEADCHNL_LV_PACING_THRESHOLD_POLARITY: NORMAL
MDC_IDC_MSMT_LEADCHNL_RA_DTM: NORMAL
MDC_IDC_MSMT_LEADCHNL_RA_IMPEDANCE_VALUE: 448
MDC_IDC_MSMT_LEADCHNL_RA_PACING_THRESHOLD_POLARITY: NORMAL
MDC_IDC_MSMT_LEADCHNL_RA_SENSING_INTR_AMPL: 1.6
MDC_IDC_MSMT_LEADCHNL_RV_DTM: NORMAL
MDC_IDC_MSMT_LEADCHNL_RV_IMPEDANCE_VALUE: 417
MDC_IDC_MSMT_LEADCHNL_RV_PACING_THRESHOLD_POLARITY: NORMAL
MDC_IDC_PG_IMPLANT_DTM: NORMAL
MDC_IDC_PG_MFG: NORMAL
MDC_IDC_PG_MODEL: NORMAL
MDC_IDC_PG_SERIAL: NORMAL
MDC_IDC_PG_TYPE: NORMAL
MDC_IDC_SESS_DTM: NORMAL
MDC_IDC_SESS_TYPE: NORMAL
MDC_IDC_SET_BRADY_AT_MODE_SWITCH_RATE: 170
MDC_IDC_SET_BRADY_LOWRATE: 60
MDC_IDC_SET_BRADY_MAX_SENSOR_RATE: 130
MDC_IDC_SET_BRADY_MAX_TRACKING_RATE: 130
MDC_IDC_SET_BRADY_MODE: NORMAL
MDC_IDC_SET_BRADY_PAV_DELAY: 180
MDC_IDC_SET_BRADY_SAV_DELAY: 120
MDC_IDC_SET_CRT_LVRV_DELAY: 0
MDC_IDC_SET_CRT_PACED_CHAMBERS: NORMAL
MDC_IDC_SET_LEADCHNL_LV_PACING_AMPLITUDE: 2.5
MDC_IDC_SET_LEADCHNL_LV_PACING_PULSEWIDTH: 1.5
MDC_IDC_SET_LEADCHNL_RA_PACING_AMPLITUDE: 2.4
MDC_IDC_SET_LEADCHNL_RA_PACING_POLARITY: NORMAL
MDC_IDC_SET_LEADCHNL_RA_PACING_PULSEWIDTH: 0.4
MDC_IDC_SET_LEADCHNL_RA_SENSING_POLARITY: NORMAL
MDC_IDC_SET_LEADCHNL_RA_SENSING_SENSITIVITY: 0.5
MDC_IDC_SET_LEADCHNL_RV_PACING_AMPLITUDE: 2.5
MDC_IDC_SET_LEADCHNL_RV_PACING_POLARITY: NORMAL
MDC_IDC_SET_LEADCHNL_RV_PACING_PULSEWIDTH: 0.4
MDC_IDC_SET_LEADCHNL_RV_SENSING_POLARITY: NORMAL
MDC_IDC_SET_LEADCHNL_RV_SENSING_SENSITIVITY: 2.5
MDC_IDC_SET_ZONE_STATUS: NORMAL
MDC_IDC_SET_ZONE_TYPE: NORMAL
MDC_IDC_STAT_AT_BURDEN_PERCENT: 0
MDC_IDC_STAT_BRADY_RA_PERCENT_PACED: 2
MDC_IDC_STAT_BRADY_RV_PERCENT_PACED: 99
MDC_IDC_STAT_CRT_LV_PERCENT_PACED: 99

## (undated) DEVICE — BALN EVERCROSS OTW .035 6F 9X40 80

## (undated) DEVICE — SET PRIMARY GRVTY 10DP MALE LL 104IN

## (undated) DEVICE — Device: Brand: MIRACLEBROS 3

## (undated) DEVICE — DEV INFL MONARCH 25W

## (undated) DEVICE — ELECTRD RETRN/GRND MEGADYNE SGL/PLT W/CORD 9FT DISP

## (undated) DEVICE — INTRO LAT VEIN WORLEY ADV RENAL 5.5F 62CM

## (undated) DEVICE — PLASMABLADE PS210-030S 3.0S LOCK: Brand: PLASMABLADE™

## (undated) DEVICE — HI-TORQUE WHISPER MS GUIDE WIRE .014 J TIP 3.0 CM X 190 CM: Brand: HI-TORQUE WHISPER

## (undated) DEVICE — VEIN SELCT HK 75CM

## (undated) DEVICE — DRSNG SURESITE123 4X4.8IN

## (undated) DEVICE — CANN NASL CO2 DIVIDED A/

## (undated) DEVICE — VEIN SELCT VERT 75CM

## (undated) DEVICE — LEX ELECTRO PHYSIOLOGY: Brand: MEDLINE INDUSTRIES, INC.

## (undated) DEVICE — RADIFOCUS GLIDEWIRE ADVANTAGE GUIDEWIRE: Brand: GLIDEWIRE ADVANTAGE

## (undated) DEVICE — MEDI-VAC YANKAUER SUCTION HANDLE W/BULBOUS TIP: Brand: CARDINAL HEALTH

## (undated) DEVICE — RUNTHROUGH NS EXTRA FLOPPY PTCA GUIDEWIRE: Brand: RUNTHROUGH

## (undated) DEVICE — ST EXT IV SMRTSTE 2VLV FIX M LL 6ML 41

## (undated) DEVICE — ADULT, W/LG. BACK PAD, RADIOTRANSPARENT ELEMENT AND LEAD WIRE COMPATIBLE W/: Brand: DEFIBRILLATION ELECTRODES

## (undated) DEVICE — LIMB HOLDER, WRIST/ANKLE: Brand: DEROYAL

## (undated) DEVICE — SOL NACL 0.9PCT 1000ML

## (undated) DEVICE — PACE/SENSE LEAD
Type: IMPLANTABLE DEVICE | Status: NON-FUNCTIONAL
Brand: ACUITY™ X4 STRAIGHT

## (undated) DEVICE — GUIDE CORNRY SIN JUMBO STD 9F 40CM

## (undated) DEVICE — TUBING, SUCTION, 1/4" X 10', STRAIGHT: Brand: MEDLINE

## (undated) DEVICE — DECANT BG O JET

## (undated) DEVICE — KT 2 CONTRST ADMIN

## (undated) DEVICE — DRSNG TELFA PAD NONADH STR 1S 3X8IN

## (undated) DEVICE — ST INF PRI SMRTSTE 20DRP 2VLV 24ML 117

## (undated) DEVICE — CAUTERY TIP POLISHER: Brand: DEVON